# Patient Record
Sex: MALE | Race: WHITE | NOT HISPANIC OR LATINO | URBAN - METROPOLITAN AREA
[De-identification: names, ages, dates, MRNs, and addresses within clinical notes are randomized per-mention and may not be internally consistent; named-entity substitution may affect disease eponyms.]

---
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
## 2024-03-26 NOTE — TELEPHONE ENCOUNTER
DIAGNOSIS: Knee pain / no images / self / [employer]     APPOINTMENT DATE: 3.27.24   NOTES STATUS DETAILS   MEDICATION LIST Internal

## 2024-03-27 ENCOUNTER — OFFICE VISIT (OUTPATIENT)
Dept: ORTHOPEDICS | Facility: CLINIC | Age: 19
End: 2024-03-27
Payer: COMMERCIAL

## 2024-03-27 ENCOUNTER — PRE VISIT (OUTPATIENT)
Dept: ORTHOPEDICS | Facility: CLINIC | Age: 19
End: 2024-03-27

## 2024-03-27 ENCOUNTER — ANCILLARY PROCEDURE (OUTPATIENT)
Dept: GENERAL RADIOLOGY | Facility: CLINIC | Age: 19
End: 2024-03-27
Attending: FAMILY MEDICINE
Payer: COMMERCIAL

## 2024-03-27 DIAGNOSIS — S83.005S PATELLAR DISLOCATION, LEFT, SEQUELA: Primary | ICD-10-CM

## 2024-03-27 DIAGNOSIS — M25.569 KNEE PAIN: ICD-10-CM

## 2024-03-27 DIAGNOSIS — M89.8X9 HETEROTOPIC OSSIFICATION: ICD-10-CM

## 2024-03-27 PROCEDURE — 99203 OFFICE O/P NEW LOW 30 MIN: CPT | Performed by: FAMILY MEDICINE

## 2024-03-27 PROCEDURE — 73562 X-RAY EXAM OF KNEE 3: CPT | Mod: LT | Performed by: RADIOLOGY

## 2024-03-27 NOTE — LETTER
"  3/27/2024      RE: Suresh Will  [address] [place], [place] [address]  [facility] [address]     Dear Colleague,    Thank you for referring your patient, Suresh Will, to the [facility] [place]. Please see a copy of my visit note below.    Left knee pain, patella instability    Today, the patient reports that he has had intermittent left knee pain since 2021 since a patella dislocation playing football.  During that injury the kneecap was dislocated laterally on the football field but was replaced by athletic trainers on the field.  Patient did not need additional evaluation at that time.  Since 3.25.24, he was just in his kitchen walking and he felt some instability and inability to extend the knee. He had some persistent popping and then yesterday he had another pop with pain and has been able to extend the knee. He did have some increased swelling since this instance. He is not taking any medication for the pain regularly. He did do some physical therapy with his high school [employer]  after his initial football injury. 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  His current sports include weight lifting.  He is not involved in planting and pivoting sports at this time.    He has noted a \"lump\" in the soft tissues at the distal lateral left thigh that \"pops out\" from time to time.  He separates this from the symptoms he notes with his anterior knee.              PMH:  Left patella dislocation    Active problem list:  There is no problem list on file for this patient.      FH:  No family history on file.    SH:  Social History     Socioeconomic History     Marital status: Single     Spouse name: Not on file     Number of children: Not on file     Years of education: Not on file     Highest education level: Not on file   Occupational History     Not on file   Tobacco Use     Smoking status: Not on file     Smokeless tobacco: Not on " file   Substance and Sexual Activity     Alcohol use: Not on file     Drug use: Not on file     Sexual activity: Not on file   Other Topics Concern     Not on file   Social History Narrative     Not on file     Social Determinants of Health     Financial Resource Strain: Not on file   Food Insecurity: Not on file   Transportation Needs: Not on file   Physical Activity: Not on file   Stress: Not on file   Social Connections: Not on file   Interpersonal Safety: Not on file   Housing Stability: Not on file       MEDS:  See EMR, reviewed  ALL:  See EMR, reviewed    REVIEW OF SYSTEMS:  CONSTITUTIONAL:NEGATIVE for fever, chills, change in weight  INTEGUMENTARY/SKIN: NEGATIVE for worrisome rashes, moles or lesions  EYES: NEGATIVE for vision changes or irritation  ENT/MOUTH: NEGATIVE for ear, mouth and throat problems  RESP:NEGATIVE for significant cough or SOB  BREAST: NEGATIVE for masses, tenderness or discharge  CV: NEGATIVE for chest pain, palpitations or peripheral edema  GI: NEGATIVE for nausea, abdominal pain, heartburn, or change in bowel habits  :NEGATIVE for frequency, dysuria, or hematuria  :NEGATIVE for frequency, dysuria, or hematuria  NEURO: NEGATIVE for weakness, dizziness or paresthesias  ENDOCRINE: NEGATIVE for temperature intolerance, skin/hair changes  HEME/ALLERGY/IMMUNE: NEGATIVE for bleeding problems  PSYCHIATRIC: NEGATIVE for changes in mood or affect        Objective: He has a thumbprint sized, smooth, uniform, subcutaneous mass present in the soft tissues lateral to the superior aspect of the left knee, along the distal femur.  Nontender over the medial lateral patellar facet.  Nontender over the area of the MPFL.  Symmetrical amount of medial lateral patellar excursion.  Nontender over the medial or lateral joint line of the knee.  No effusion.  I can flex and extend the knee fully.  Anterior and posterior drawer are negative.  Lachman's appears negative.  MCL and LCL stresses are negative.   Overlying skin is normal.  Appropriate conversation and affect.    Personally viewed the patient standing x-rays of the knee that suggests no degenerative change.  There is a smooth, ovoid, thumbprint sized calcification in the soft tissue that is lateral to the left superior knee, that could be consistent with a calcified lipoma or an area of heterotopic ossification.    Assessment: Recurrent left-sided knee patellar subluxation status post history of leonardo dislocation, suspect MPFL disruption.  #2 x-ray finding of soft tissue calcification, possibly consistent with calcified lipoma or heterotopic ossification.    Plan: MRI of the knee is pending to evaluate the MPFL.  We discussed surgical options.  Will discuss it further in phone call follow-up after the MRI.                Again, thank you for allowing me to participate in the care of your patient.      Sincerely,    Connor Momin MD

## 2024-03-27 NOTE — PROGRESS NOTES
"Left knee pain, patella instability    Today, the patient reports that he has had intermittent left knee pain since 2021 since a patella dislocation playing football.  During that injury the kneecap was dislocated laterally on the football field but was replaced by athletic trainers on the field.  Patient did not need additional evaluation at that time.  Since 3.25.24, he was just in his kitchen walking and he felt some instability and inability to extend the knee. He had some persistent popping and then yesterday he had another pop with pain and has been able to extend the knee. He did have some increased swelling since this instance. He is not taking any medication for the pain regularly. He did do some physical therapy with his high school [employer]  after his initial football injury. 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  His current sports include weight lifting.  He is not involved in planting and pivoting sports at this time.    He has noted a \"lump\" in the soft tissues at the distal lateral left thigh that \"pops out\" from time to time.  He separates this from the symptoms he notes with his anterior knee.              PMH:  Left patella dislocation    Active problem list:  There is no problem list on file for this patient.      FH:  No family history on file.    SH:  Social History     Socioeconomic History    Marital status: Single     Spouse name: Not on file    Number of children: Not on file    Years of education: Not on file    Highest education level: Not on file   Occupational History    Not on file   Tobacco Use    Smoking status: Not on file    Smokeless tobacco: Not on file   Substance and Sexual Activity    Alcohol use: Not on file    Drug use: Not on file    Sexual activity: Not on file   Other Topics Concern    Not on file   Social History Narrative    Not on file     Social Determinants of Health     Financial Resource Strain: Not on file   Food " Insecurity: Not on file   Transportation Needs: Not on file   Physical Activity: Not on file   Stress: Not on file   Social Connections: Not on file   Interpersonal Safety: Not on file   Housing Stability: Not on file       MEDS:  See EMR, reviewed  ALL:  See EMR, reviewed    REVIEW OF SYSTEMS:  CONSTITUTIONAL:NEGATIVE for fever, chills, change in weight  INTEGUMENTARY/SKIN: NEGATIVE for worrisome rashes, moles or lesions  EYES: NEGATIVE for vision changes or irritation  ENT/MOUTH: NEGATIVE for ear, mouth and throat problems  RESP:NEGATIVE for significant cough or SOB  BREAST: NEGATIVE for masses, tenderness or discharge  CV: NEGATIVE for chest pain, palpitations or peripheral edema  GI: NEGATIVE for nausea, abdominal pain, heartburn, or change in bowel habits  :NEGATIVE for frequency, dysuria, or hematuria  :NEGATIVE for frequency, dysuria, or hematuria  NEURO: NEGATIVE for weakness, dizziness or paresthesias  ENDOCRINE: NEGATIVE for temperature intolerance, skin/hair changes  HEME/ALLERGY/IMMUNE: NEGATIVE for bleeding problems  PSYCHIATRIC: NEGATIVE for changes in mood or affect        Objective: He has a thumbprint sized, smooth, uniform, subcutaneous mass present in the soft tissues lateral to the superior aspect of the left knee, along the distal femur.  Nontender over the medial lateral patellar facet.  Nontender over the area of the MPFL.  Symmetrical amount of medial lateral patellar excursion.  Nontender over the medial or lateral joint line of the knee.  No effusion.  I can flex and extend the knee fully.  Anterior and posterior drawer are negative.  Lachman's appears negative.  MCL and LCL stresses are negative.  Overlying skin is normal.  Appropriate conversation and affect.    Personally viewed the patient standing x-rays of the knee that suggests no degenerative change.  There is a smooth, ovoid, thumbprint sized calcification in the soft tissue that is lateral to the left superior knee, that could  be consistent with a calcified lipoma or an area of heterotopic ossification.    Assessment: Recurrent left-sided knee patellar subluxation status post history of leonardo dislocation, suspect MPFL disruption.  #2 x-ray finding of soft tissue calcification, possibly consistent with calcified lipoma or heterotopic ossification.    Plan: MRI of the knee is pending to evaluate the MPFL.  We discussed surgical options.  Will discuss it further in phone call follow-up after the MRI.

## 2024-04-21 ENCOUNTER — ANCILLARY PROCEDURE (OUTPATIENT)
Dept: MRI IMAGING | Facility: CLINIC | Age: 19
End: 2024-04-21
Attending: FAMILY MEDICINE
Payer: COMMERCIAL

## 2024-04-21 DIAGNOSIS — S83.005S PATELLAR DISLOCATION, LEFT, SEQUELA: ICD-10-CM

## 2024-04-21 DIAGNOSIS — M89.8X9 HETEROTOPIC OSSIFICATION: ICD-10-CM

## 2024-04-21 PROCEDURE — 73721 MRI JNT OF LWR EXTRE W/O DYE: CPT | Mod: LT | Performed by: RADIOLOGY

## 2024-04-25 ENCOUNTER — VIRTUAL VISIT (OUTPATIENT)
Dept: ORTHOPEDICS | Facility: CLINIC | Age: 19
End: 2024-04-25
Payer: COMMERCIAL

## 2024-04-25 ENCOUNTER — TELEPHONE (OUTPATIENT)
Dept: ORTHOPEDICS | Facility: CLINIC | Age: 19
End: 2024-04-25

## 2024-04-25 DIAGNOSIS — M23.42 LOOSE BODY OF LEFT KNEE: ICD-10-CM

## 2024-04-25 DIAGNOSIS — S83.005S PATELLAR DISLOCATION, LEFT, SEQUELA: Primary | ICD-10-CM

## 2024-04-25 PROCEDURE — 99213 OFFICE O/P EST LOW 20 MIN: CPT | Mod: 93 | Performed by: FAMILY MEDICINE

## 2024-04-25 NOTE — LETTER
4/25/2024       RE: Suresh Will  [address]  [facility] [address]     Dear Colleague,    Thank you for referring your patient, Suresh Will, to the [facility]TS MEDICINE [facility]IN[facility] [place] at [facility]. Please see a copy of my visit note below.    Phone fup MRI left knee, h/o Recurrent left-sided knee patellar subluxation status post history of leonardo dislocation, suspect MPFL disruption. #2 x-ray finding of soft tissue calcification, possibly consistent with calcified lipoma or heterotopic ossification.     MRI left knee 4/21/2024 consistent with an intact MPFL, with some full-thickness cartilage loss at the median ridge and along the far lateral trochlear groove.  Large joint body lateral to the suprapatellar recess 2 x 2 cm in size.        Phone start:  11:40  Phone stop:  11:45a      S: I personally reviewed with the patient the MRI results.  The MPFL appears to be intact.  It is possible to the large joint body could be responsible for his mechanical knee symptoms.  Patient was referred to Dr. Toure in orthopedics to discuss the MRI and options for the knee      O:  GENERAL: healthy, alert, without distress  RESP: No audible wheeze, cough.  No increased work of breathing.    NEURO:  Mentation and speech appropriate for age.  PSYCH: mentation appears normal, concentration appears appropriate, judgement and insight intact.  MSK:      He has had intermittent left knee pain since 2021 since a patella dislocation playing football.  During that injury the kneecap was dislocated laterally on the football field but was replaced by athletic trainers on the field.  Patient did not need additional evaluation at that time.  Since 3.25.24, he was just in his kitchen walking and he felt some instability and inability to extend the knee. He had some persistent popping and then yesterday he had another pop with pain and has been able  "to extend the knee. He did have some increased swelling since this instance. He is not taking any medication for the pain regularly. He did do some physical therapy with his high school [employer]  after his initial football injury. 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  His current sports include weight lifting.  He is not involved in planting and pivoting sports at this time.     He has noted a \"lump\" in the soft tissues at the distal lateral left thigh that \"pops out\" from time to time.  He separates this from the symptoms he notes with his anterior knee.  He has a thumbprint sized, smooth, uniform, subcutaneous mass present in the soft tissues lateral to the superior aspect of the left knee, along the distal femur.       Again, thank you for allowing me to participate in the care of your patient.      Sincerely,    Connor Momin MD    "

## 2024-04-25 NOTE — TELEPHONE ENCOUNTER
----- Message from Kamryn Cam ATC sent at 4/25/2024 11:44 AM CDT -----  Hey can you guys help him get set up with a consult with Dr. Toure. Thank you!      Kamryn HERNANDEZ

## 2024-04-25 NOTE — PROGRESS NOTES
Phone fup MRI left knee, h/o Recurrent left-sided knee patellar subluxation status post history of leonardo dislocation, suspect MPFL disruption. #2 x-ray finding of soft tissue calcification, possibly consistent with calcified lipoma or heterotopic ossification.     MRI left knee 4/21/2024 consistent with an intact MPFL, with some full-thickness cartilage loss at the median ridge and along the far lateral trochlear groove.  Large joint body lateral to the suprapatellar recess 2 x 2 cm in size.        Phone start:  11:40  Phone stop:  11:45a      S: I personally reviewed with the patient the MRI results.  The MPFL appears to be intact.  It is possible to the large joint body could be responsible for his mechanical knee symptoms.  Patient was referred to Dr. Toure in orthopedics to discuss the MRI and options for the knee      O:  GENERAL: healthy, alert, without distress  RESP: No audible wheeze, cough.  No increased work of breathing.    NEURO:  Mentation and speech appropriate for age.  PSYCH: mentation appears normal, concentration appears appropriate, judgement and insight intact.  MSK:      He has had intermittent left knee pain since 2021 since a patella dislocation playing football.  During that injury the kneecap was dislocated laterally on the football field but was replaced by athletic trainers on the field.  Patient did not need additional evaluation at that time.  Since 3.25.24, he was just in his kitchen walking and he felt some instability and inability to extend the knee. He had some persistent popping and then yesterday he had another pop with pain and has been able to extend the knee. He did have some increased swelling since this instance. He is not taking any medication for the pain regularly. He did do some physical therapy with his high school [employer]  after his initial football injury. He is a sophomore at the [employer], studying [employer].  His current sports  "include weight lifting.  He is not involved in planting and pivoting sports at this time.     He has noted a \"lump\" in the soft tissues at the distal lateral left thigh that \"pops out\" from time to time.  He separates this from the symptoms he notes with his anterior knee.  He has a thumbprint sized, smooth, uniform, subcutaneous mass present in the soft tissues lateral to the superior aspect of the left knee, along the distal femur.   "

## 2024-04-25 NOTE — TELEPHONE ENCOUNTER
Patient confirmed scheduled appointment:  Date: 4/29  Time: 11:00am  Visit type: New knee  Provider: Dr. Toure

## 2024-04-26 NOTE — TELEPHONE ENCOUNTER
DIAGNOSIS: L knee, per Dr. Momin    APPOINTMENT DATE: 4/29/24   NOTES STATUS DETAILS   OFFICE NOTE from referring provider Internal 3/27/24 - Connor Momin MD - [facility] Sports Med   MEDICATION LIST Internal    MRI Internal 4/21/24 - MR Knee Left   XRAYS (IMAGES & REPORTS) Internal 3/27/24 - XR Knee Left

## 2024-04-29 ENCOUNTER — OFFICE VISIT (OUTPATIENT)
Dept: ORTHOPEDICS | Facility: CLINIC | Age: 19
End: 2024-04-29
Payer: COMMERCIAL

## 2024-04-29 ENCOUNTER — TELEPHONE (OUTPATIENT)
Dept: ORTHOPEDICS | Facility: CLINIC | Age: 19
End: 2024-04-29

## 2024-04-29 ENCOUNTER — PRE VISIT (OUTPATIENT)
Dept: ORTHOPEDICS | Facility: CLINIC | Age: 19
End: 2024-04-29

## 2024-04-29 VITALS — WEIGHT: 205 LBS | HEIGHT: 69 IN | BODY MASS INDEX: 30.36 KG/M2

## 2024-04-29 DIAGNOSIS — G89.29 CHRONIC PAIN OF LEFT KNEE: Primary | ICD-10-CM

## 2024-04-29 DIAGNOSIS — M25.562 CHRONIC PAIN OF LEFT KNEE: Primary | ICD-10-CM

## 2024-04-29 PROCEDURE — 99204 OFFICE O/P NEW MOD 45 MIN: CPT | Mod: GC | Performed by: ORTHOPAEDIC SURGERY

## 2024-04-29 NOTE — PROGRESS NOTES
Patient seen and examined with the resident. I also personally reviewed the images and interpreted the imaging myself.     Assesment: recurrent patellar instability    Large loose body    Patellar and lateral trochlear chondrosis    Plan: I had a long discussion with the patient.  Reviewed the diagnosis potential treatment options.  I offered him the following course of action: Examination under anesthesia, knee arthroscopy, loose body removal, chondroplasty, autologous chondrocyte implantation, medial patellofemoral ligament reconstruction with allograft, possible lateral retinacular lengthening, tibial tubercle osteotomy.    I discussed with him the pros cons risks and benefits of surgery versus nonsurgical.  We discussed the expected course of recovery and alternative treatment options.  Will look for time to schedule this can be completed.    I agree with history, physical and imaging as well as the assessment and plan as detailed by Dr. Devi.

## 2024-04-29 NOTE — PROGRESS NOTES
CHIEF CONCERN: Left recurrent patellar instability    HISTORY:   Suresh Will is a 19-year-old otherwise healthy male who presents for initial evaluation of left recurrent patellar instability.      Patient states that he had his first patellar dislocation in 2021 when playing football.  This required reduction by the .  He was evaluated at that time and started on a formal physical therapy regimen.  His symptoms improved and his knee felt good for him.  Denies any feelings of ongoing subluxations though he did have catching sensations.    1 month prior to arrival, he believes he had a second patellar instability episode.  He was in his kitchen when he pivoted and felt like his knee was locked and had to provide a self reduction.    Since that time, his pain and swelling have improved though he has ongoing patellar apprehension.  No pain at rest.  6/10 pain at its worst.  No aggravating or alleviating factors; states that it seems random.  Denies use of pain medications.  Remote history of physical therapy.  No injections.    Is able to tolerate running but has constant clicking and catching sensations.  No locking episodes.    No history of surgery to the left knee.  No contralateral knee issues.  No known family history of patellar instability or hyperlaxity    PAST MEDICAL HISTORY: (Reviewed with the patient and in the [facility] medical record)  No past medical history on file.    PAST SURGICAL HISTORY: (Reviewed with the patient and in the [facility] medical record)  No past surgical history on file.    MEDICATIONS: (Reviewed with the patient and in the [facility] medical record)    Notable medications include: N/a    ALLERGIES: (Reviewed with the patient and in the [facility] medical record)   No Known Allergies      SOCIAL HISTORY: (Reviewed with the patient and in the medical record)  --Tobacco: Denies  --Occupation: Sophomore at the [employer])  --Avocation/Sport: Lifting,  running    FAMILY HISTORY: (Reviewed with the patient and in the medical record)  -- No family history of bleeding, clotting, or difficulty with anesthesia    REVIEW OF SYSTEMS: (Reviewed with the patient and on the health intake form)  -- A comprehensive 10 point review of systems was conducted and is negative except as noted in the HPI    EXAM:     General: Awake, Alert and Oriented, No acute Distress. Articulate and Interactive    Body mass index is 30.27 kg/m .    Left lower extremity :  Skin is Warm and Well perfused, no suggestion of infection  No effusion  Nontender to palpation about the superior/inferior pole patella, medial/lateral patellar facet, medial/lateral joint line  Knee range of motion 0-140 without pain or crepitus symmetric to the contralateral  2 quadrants of medial patellar translation, 2 quadrants of lateral patellar translation with apprehension  -5 patellar tilt  No J sign  Stable to varus and valgus stress at 0 and 30 degrees of knee flexion  Stable Lachman and posterior drawer  EHL/FHL/TA/GS 5/5  Sensation intact L3-S1  2+ Dorsalis Pedis Pulse    Beighton score: 4/9 (bilateral fifth MCP hyperextension, bilateral elbow hyperextension)    IMAGING:    Radiographs of the left knee from 3/27/2024 were independently reviewed by me and findings were discussed with the patient today. The imaging demonstrates a loose body in the lateral gutter.  No acute fractures or dislocations.  No significant degenerative change.  CDI 1.51 (elevated).  Small anterior distal femoral boss.  Sulcus angle 120 degrees (normal)    MRI of the left knee from 4/21/2024 were independently reviewed by me and findings were discussed with the patient today. The imaging demonstrates lateral patellar tilt 22.6 degrees (elevated), TT-TG 18 mm (normal), patellotrochlear index 0.28 (normal), large loose body, large cartilage loss about the medial trochlea and medial patellar facet, chondral fracture of the medial  eminence.    ASSESSMENT:  Left recurrent patellar instability  Left knee loose body    A long conversation the patient regarding his symptoms and how they align with his imaging and examination.    Counseled the patient that nonoperative management is the mainstay of treatment for first-time patellar dislocations.  This had been previously successful for the past 3 years.  With recurrent instability, the risk of ongoing instability episodes is roughly 10 to 20%.    Discussed nonoperative and operative management strategies.  Nonoperative management would entail activity modification and physical therapy with a recognition of possible ongoing patellar instability events.    Operative management could take 2 different forms.  1 could simply be an arthroscopic loose body removal, recognizing that this will not address his risk of future patellar instability events.  The second option could entail a diagnostic arthroscopy with loose body removal, MPFL allograft reconstruction, tibial tubercle osteotomy, SWAPNIL biopsy, possible lateral retinacular lengthening.    Discussed the risks, benefits, and alternatives of surgical intervention including but not limited to infection, damage nearby structures like blood vessels and nerves, incomplete pain relief, ongoing patellar instability.    Discussed the expected postoperative rehabilitation protocols for both surgical options.    Patient acknowledged understanding and stated he is leaning towards the more comprehensive surgical intervention.    PLAN:  Operative plan: left knee diagnostic arthroscopy with loose body removal, MPFL allograft reconstruction, tibial tubercle osteotomy, SWAPNIL biopsy, possible lateral retinacular lengthening  Okay to continue weightbearing and activities as tolerated in the interim  Follow-up with Dr. Toure date of surgery    Seen and discussed with Dr. Mesfin Devi MD  Orthopaedic Surgery PGY-5

## 2024-04-29 NOTE — LETTER
4/29/2024         RE: Suresh Will  [address] [place] [address] [address]  [facility] [address]        Dear Colleague,    Thank you for referring your patient, Suresh Will, to the [facility] [place]. Please see a copy of my visit note below.    CHIEF CONCERN: Left recurrent patellar instability    HISTORY:   Suresh Will is a 19-year-old otherwise healthy male who presents for initial evaluation of left recurrent patellar instability.      Patient states that he had his first patellar dislocation in 2021 when playing football.  This required reduction by the .  He was evaluated at that time and started on a formal physical therapy regimen.  His symptoms improved and his knee felt good for him.  Denies any feelings of ongoing subluxations though he did have catching sensations.    1 month prior to arrival, he believes he had a second patellar instability episode.  He was in his kitchen when he pivoted and felt like his knee was locked and had to provide a self reduction.    Since that time, his pain and swelling have improved though he has ongoing patellar apprehension.  No pain at rest.  6/10 pain at its worst.  No aggravating or alleviating factors; states that it seems random.  Denies use of pain medications.  Remote history of physical therapy.  No injections.    Is able to tolerate running but has constant clicking and catching sensations.  No locking episodes.    No history of surgery to the left knee.  No contralateral knee issues.  No known family history of patellar instability or hyperlaxity    PAST MEDICAL HISTORY: (Reviewed with the patient and in the [facility] medical record)  No past medical history on file.    PAST SURGICAL HISTORY: (Reviewed with the patient and in the [facility] medical record)  No past surgical history on file.    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 Articulate and Interactive    Body mass index is 30.27 kg/m .    Left lower extremity :  Skin is Warm and Well perfused, no suggestion of infection  No effusion  Nontender to palpation about the superior/inferior pole patella, medial/lateral patellar facet, medial/lateral joint line  Knee range of motion 0-140 without pain or crepitus symmetric to the contralateral  2 quadrants of medial patellar translation, 2 quadrants of lateral patellar translation with apprehension  -5 patellar tilt  No J sign  Stable to varus and valgus stress at 0 and 30 degrees of knee flexion  Stable Lachman and posterior drawer  EHL/FHL/TA/GS 5/5  Sensation intact L3-S1  2+ Dorsalis Pedis Pulse    Beighton score: 4/9 (bilateral fifth MCP hyperextension, bilateral elbow hyperextension)    IMAGING:    Radiographs of the left knee from 3/27/2024 were independently reviewed by me and findings were discussed with the patient today. The imaging demonstrates a loose body in the lateral gutter.  No acute fractures or dislocations.  No significant degenerative change.  CDI 1.51 (elevated).  Small anterior distal femoral boss.  Sulcus angle 120 degrees (normal)    MRI of the left knee from 4/21/2024 were independently reviewed by me and findings were discussed with the  patient today. The imaging demonstrates lateral patellar tilt 22.6 degrees (elevated), TT-TG 18 mm (normal), patellotrochlear index 0.28 (normal), large loose body, large cartilage loss about the medial trochlea and medial patellar facet, chondral fracture of the medial eminence.    ASSESSMENT:  Left recurrent patellar instability  Left knee loose body    A long conversation the patient regarding his symptoms and how they align with his imaging and examination.    Counseled the patient that nonoperative management is the mainstay of treatment for first-time patellar dislocations.  This had been previously successful for the past 3 years.  With recurrent instability, the risk of ongoing instability episodes is roughly 10 to 20%.    Discussed nonoperative and operative management strategies.  Nonoperative management would entail activity modification and physical therapy with a recognition of possible ongoing patellar instability events.    Operative management could take 2 different forms.  1 could simply be an arthroscopic loose body removal, recognizing that this will not address his risk of future patellar instability events.  The second option could entail a diagnostic arthroscopy with loose body removal, MPFL allograft reconstruction, tibial tubercle osteotomy, SWAPNIL biopsy, possible lateral retinacular lengthening.    Discussed the risks, benefits, and alternatives of surgical intervention including but not limited to infection, damage nearby structures like blood vessels and nerves, incomplete pain relief, ongoing patellar instability.    Discussed the expected postoperative rehabilitation protocols for both surgical options.    Patient acknowledged understanding and stated he is leaning towards the more comprehensive surgical intervention.    PLAN:  Operative plan: left knee diagnostic arthroscopy with loose body removal, MPFL allograft reconstruction, tibial tubercle osteotomy, SWAPNIL biopsy, possible lateral  retinacular lengthening  Okay to continue weightbearing and activities as tolerated in the interim  Follow-up with Dr. Toure date of surgery    Seen and discussed with Dr. Mesfin Devi MD  Orthopaedic Surgery PGY-5    Patient seen and examined with the resident. I also personally reviewed the images and interpreted the imaging myself.     Assesment: recurrent patellar instability    Large loose body    Patellar and lateral trochlear chondrosis    Plan: I had a long discussion with the patient.  Reviewed the diagnosis potential treatment options.  I offered him the following course of action: Examination under anesthesia, knee arthroscopy, loose body removal, chondroplasty, autologous chondrocyte implantation, medial patellofemoral ligament reconstruction with allograft, possible lateral retinacular lengthening, tibial tubercle osteotomy.    I discussed with him the pros cons risks and benefits of surgery versus nonsurgical.  We discussed the expected course of recovery and alternative treatment options.  Will look for time to schedule this can be completed.    I agree with history, physical and imaging as well as the assessment and plan as detailed by Dr. Devi.       Again, thank you for allowing me to participate in the care of your patient.        Sincerely,        Jorge Toure MD

## 2024-04-29 NOTE — TELEPHONE ENCOUNTER
Procedure: scope, SWAPNIL biopsy, tto and mpfl  Facility: [facility]  Length: 120 minutes  Anesthesia: Choice  Post-op appointments needed: 2 weeks provider only, 6 weeks with provider only.  Surgery packet/instructions given to patient?  Yes     Pre-Operative Teaching Flowsheet     Person(s) involved in teaching: Patient     Motivation Level:  Receptive (willing/able to accept information) and asks appropriate questions where applicable: Yes  Any cultural factors/Cheondoism beliefs that may influence understanding or compliance? No     Patient demonstrates understanding of the following:  Pre-operative planning, including the necessary appointments and preparation needed prior to surgery: Yes  Which situations necessitate calling provider and whom to contact: Yes  Pain management techniques pre and post op: Yes  How, and when, to access community resources: Yes    Who will drive and stay/ with patient after surgery: Family  Pre-op exam unsure  PT to be completed at .         Additional Teaching Concerns Addressed:   Post-operative living arrangements and necessary adaptations to living environment.     Instructional Materials Used/Given: Yes, pre-op packet given including forms for Your surgery day, preparing for surgery, showering before surgery, Stop light tool introduced, Opioid pain medication guideline, pre-op physical form, and map  Patient expressed understanding of all forms given, questions were answered and will review in more detail at home.     Time spent with patient: 20 minutes.

## 2024-05-14 NOTE — TELEPHONE ENCOUNTER
Patient is scheduled for surgery with Dr. Toure    Spoke with: Patient    Date of Surgery: 10/8/24    Location: [place]    Post ops: 1 & 6 weeks    Pre op with Provider: Complete    H&P: Scheduled with PAC 9/24/24    Additional imaging/appointments: N/A    Surgery packet: Received in clinic     Additional comments: N/A        Natalie Verde MA on 5/14/2024 at 11:35 AM

## 2024-05-19 ENCOUNTER — HEALTH MAINTENANCE LETTER (OUTPATIENT)
Age: 19
End: 2024-05-19

## 2024-06-27 NOTE — TELEPHONE ENCOUNTER
FUTURE VISIT INFORMATION      SURGERY INFORMATION:  Date: 10/8/24  [facility]  Surgeon:  Jorge Toure MD   Anesthesia Type:  choice  Procedure: Examination under anesthesia, knee arthroscopy, chondroplasty, loose body removal, autologous chondrocyte implantation biopsy Tibial tubercle osteotomy and lateral lengthening Medial patellofemoral ligament reconstruction with allograft   Consult: ov 4/29/24    RECORDS REQUESTED FROM:       Pertinent Medical History: None

## 2024-09-09 NOTE — TELEPHONE ENCOUNTER
Received message that patient would like to potentially reschedule their surgery with Dr. Toure scheduled on 10/08/24. Patient asked to be rescheduled to week of 11/25 specifically. Let patient know at this time Dr. Toure is fully booked that week and following that week, next available date would be 12/10. Patient said he would like to confirm a few things work work/family and will give our office a call back. Patient provided with callback number [phone].     Ashley Kelly on 9/9/2024 at 11:04 AM

## 2024-09-09 NOTE — TELEPHONE ENCOUNTER
Rescheduled surgery with Dr. Toure from 10/08/2024 to 12/10/2024.     Spoke with: Patient     Reason for reschedule: Patient preferred to be scheduled late November or later.     [place] [address]    Patient will be seen for their new H&P by PAC on 11/21/24 at 3:00. - Provided address & floor number. Patient is aware that they will receive their start time for surgery at this appointment    Patients 1 week post op has been rescheduled to 12/19/24 at 9:40 am.   Patients 6 week post op has been rescheduled to 1/22/24 at 8:30 am.     Additional comments: NA      Patient will call back if they have any questions or concerns.    Ashley Kelly on 9/9/2024 at 12:00 PM

## 2024-09-10 NOTE — TELEPHONE ENCOUNTER
FUTURE VISIT INFORMATION        SURGERY INFORMATION:  Date: 12/10/24  [facility]  Surgeon:  Jorge Toure MD   Anesthesia Type:  choice  Procedure: Examination under anesthesia, knee arthroscopy, chondroplasty, loose body removal, autologous chondrocyte implantation biopsy Tibial tubercle osteotomy and lateral lengthening Medial patellofemoral ligament reconstruction with allograft   Consult: beltran 4/29/24     RECORDS REQUESTED FROM:         Pertinent Medical History: None

## 2024-09-24 ENCOUNTER — PRE VISIT (OUTPATIENT)
Dept: SURGERY | Facility: CLINIC | Age: 19
End: 2024-09-24

## 2024-11-04 ENCOUNTER — TELEPHONE (OUTPATIENT)
Dept: ORTHOPEDICS | Facility: CLINIC | Age: 19
End: 2024-11-04
Payer: COMMERCIAL

## 2024-11-04 NOTE — TELEPHONE ENCOUNTER
Left Voicemail (1st Attempt) and Sent Mychart (1st Attempt) for the patient to call back and reschedule the following:    Appointment type: Post Op MSK  Provider: Domi Mahoney  Return date: 12/19 r/s to 12/20 with Lyly Melgoza or 12/24 with Domi    
2

## 2024-11-08 ENCOUNTER — MYC MEDICAL ADVICE (OUTPATIENT)
Dept: ORTHOPEDICS | Facility: CLINIC | Age: 19
End: 2024-11-08
Payer: COMMERCIAL

## 2024-11-08 DIAGNOSIS — G89.29 CHRONIC PAIN OF LEFT KNEE: Primary | ICD-10-CM

## 2024-11-08 DIAGNOSIS — M25.562 CHRONIC PAIN OF LEFT KNEE: Primary | ICD-10-CM

## 2024-11-21 ENCOUNTER — PRE VISIT (OUTPATIENT)
Dept: SURGERY | Facility: CLINIC | Age: 19
End: 2024-11-21

## 2024-11-21 ENCOUNTER — OFFICE VISIT (OUTPATIENT)
Dept: SURGERY | Facility: CLINIC | Age: 19
End: 2024-11-21
Payer: COMMERCIAL

## 2024-11-21 ENCOUNTER — ANESTHESIA EVENT (OUTPATIENT)
Dept: SURGERY | Facility: CLINIC | Age: 19
End: 2024-11-21
Payer: COMMERCIAL

## 2024-11-21 ENCOUNTER — LAB (OUTPATIENT)
Dept: LAB | Facility: CLINIC | Age: 19
End: 2024-11-21
Payer: COMMERCIAL

## 2024-11-21 VITALS
TEMPERATURE: 98.8 F | HEIGHT: 69 IN | RESPIRATION RATE: 16 BRPM | SYSTOLIC BLOOD PRESSURE: 150 MMHG | HEART RATE: 93 BPM | OXYGEN SATURATION: 96 % | DIASTOLIC BLOOD PRESSURE: 90 MMHG | WEIGHT: 205.8 LBS | BODY MASS INDEX: 30.48 KG/M2

## 2024-11-21 DIAGNOSIS — G89.29 CHRONIC PAIN OF LEFT KNEE: ICD-10-CM

## 2024-11-21 DIAGNOSIS — Z01.818 PREOP EXAMINATION: Primary | ICD-10-CM

## 2024-11-21 DIAGNOSIS — M25.562 CHRONIC PAIN OF LEFT KNEE: ICD-10-CM

## 2024-11-21 DIAGNOSIS — Z01.818 PREOP EXAMINATION: ICD-10-CM

## 2024-11-21 LAB
ANION GAP SERPL CALCULATED.3IONS-SCNC: 11 MMOL/L (ref 7–15)
BUN SERPL-MCNC: 18.9 MG/DL (ref 6–20)
CALCIUM SERPL-MCNC: 9.8 MG/DL (ref 8.8–10.4)
CHLORIDE SERPL-SCNC: 103 MMOL/L (ref 98–107)
CREAT SERPL-MCNC: 1.17 MG/DL (ref 0.67–1.17)
EGFRCR SERPLBLD CKD-EPI 2021: >90 ML/MIN/1.73M2
ERYTHROCYTE [DISTWIDTH] IN BLOOD BY AUTOMATED COUNT: 12.1 % (ref 10–15)
GLUCOSE SERPL-MCNC: 91 MG/DL (ref 70–99)
HCO3 SERPL-SCNC: 26 MMOL/L (ref 22–29)
HCT VFR BLD AUTO: 47 % (ref 40–53)
HGB BLD-MCNC: 16.3 G/DL (ref 13.3–17.7)
MCH RBC QN AUTO: 28.7 PG (ref 26.5–33)
MCHC RBC AUTO-ENTMCNC: 34.7 G/DL (ref 31.5–36.5)
MCV RBC AUTO: 83 FL (ref 78–100)
PLATELET # BLD AUTO: 231 10E3/UL (ref 150–450)
POTASSIUM SERPL-SCNC: 3.9 MMOL/L (ref 3.4–5.3)
RBC # BLD AUTO: 5.67 10E6/UL (ref 4.4–5.9)
SODIUM SERPL-SCNC: 140 MMOL/L (ref 135–145)
WBC # BLD AUTO: 9 10E3/UL (ref 4–11)

## 2024-11-21 PROCEDURE — 80048 BASIC METABOLIC PNL TOTAL CA: CPT | Performed by: PATHOLOGY

## 2024-11-21 PROCEDURE — 99203 OFFICE O/P NEW LOW 30 MIN: CPT | Performed by: PHYSICIAN ASSISTANT

## 2024-11-21 PROCEDURE — 36415 COLL VENOUS BLD VENIPUNCTURE: CPT | Performed by: PATHOLOGY

## 2024-11-21 PROCEDURE — 85027 COMPLETE CBC AUTOMATED: CPT | Performed by: PATHOLOGY

## 2024-11-21 ASSESSMENT — LIFESTYLE VARIABLES: TOBACCO_USE: 0

## 2024-11-21 ASSESSMENT — PAIN SCALES - GENERAL: PAINLEVEL_OUTOF10: NO PAIN (0)

## 2024-11-21 ASSESSMENT — ENCOUNTER SYMPTOMS: SEIZURES: 0

## 2024-11-21 NOTE — H&P
Pre-Operative H & P     CC:  Preoperative exam to assess for increased cardiopulmonary risk while undergoing surgery and anesthesia.    Date of Encounter: 11/21/2024  Primary Care Physician:  No primary care provider on file.     Reason for visit:   Encounter Diagnoses   Name Primary?    Chronic pain of left knee Yes    Preop examination        HPI  Suresh Will is a 19 year old male who presents for pre-operative H & P in preparation for  Procedure Information       Case: [id number] Date/Time: 12/10/24 0800    Procedures:       Examination under anesthesia, knee arthroscopy, chondroplasty, loose body removal, autologous chondrocyte implantation biopsy (Left: Knee)      Tibial tubercle osteotomy and lateral lengthening (Left: Leg)      Medial patellofemoral ligament reconstruction with allograft (Left: Knee)    Anesthesia type: Choice    Diagnosis: Chronic pain of left knee [M25.562, G89.29]    Pre-op diagnosis: Chronic pain of left knee [M25.562, G89.29]    Location:  OR 11 / [facility]    Providers: Jorge Toure MD            Mr. Will has a history of left knee pain and recurrent patellar instability. He had sustained a patellar dislocation in 2021 when playing football. Then earlier this year, he was in his kitchen when he pivoted and felt like his knee was locked and had to provide a self reduction.  Since that time, his pain and swelling have improved though he has ongoing patellar apprehension. He is able to tolerate running but has constant clicking and catching sensations. He has been counseled by Dr. Toure and now presents for the above procedure.      PMH is otherwise unremarkable.     History is obtained from the patient and chart review    Hx of abnormal bleeding or anti-platelet use: denies      Past Medical History  Past Medical History:   Diagnosis Date    Chronic pain of left knee 2024       Past Surgical History  Past Surgical History:   Procedure Laterality Date     ABDOMINAL HERNIA REPAIR      in infancy    ABDOMINAL HERNIA REPAIR  11/2021       Prior to Admission Medications  No current outpatient medications on file.       Allergies  No Known Allergies    Social History  Social History     Socioeconomic History    Marital status: Single     Spouse name: Not on file    Number of children: Not on file    Years of education: Not on file    Highest education level: Not on file   Occupational History    Not on file   Tobacco Use    Smoking status: Never    Smokeless tobacco: Never   Substance and Sexual Activity    Alcohol use: Yes     Comment: Sometimes    Drug use: Not Currently    Sexual activity: Not on file   Other Topics Concern    Not on file   Social History Narrative    Not on file     Social Drivers of Health     Financial Resource Strain: Not on file   Food Insecurity: Not on file   Transportation Needs: Not on file   Physical Activity: Not on file   Stress: Not on file   Social Connections: Not on file   Interpersonal Safety: Not on file   Housing Stability: Not on file       Family History  Family History   Problem Relation Age of Onset    Anesthesia Reaction No family hx of     Deep Vein Thrombosis (DVT) No family hx of        Review of Systems  The complete review of systems is negative other than noted in the HPI or here.   Anesthesia Evaluation   Pt has had prior anesthetic.     No history of anesthetic complications       ROS/MED HX  ENT/Pulmonary:  - neg pulmonary ROS  (-) tobacco use, asthma, sleep apnea and recent URI   Neurologic:  - neg neurologic ROS  (-) no seizures and no CVA   Cardiovascular:  - neg cardiovascular ROS     METS/Exercise Tolerance: >4 METS    Hematologic:  - neg hematologic  ROS  (-) history of blood clots and history of blood transfusion   Musculoskeletal: Comment: Left knee pain, recurrent patellar instability.      GI/Hepatic:  - neg GI/hepatic ROS  (-) GERD and liver disease   Renal/Genitourinary:  - neg Renal ROS  (-) renal disease  "  Endo:  - neg endo ROS  (-) Type II DM   Psychiatric/Substance Use:  - neg psychiatric ROS     Infectious Disease:  - neg infectious disease ROS     Malignancy:  - neg malignancy ROS     Other:  - neg other ROS          BP (!) 150/90 (BP Location: Right arm, Patient Position: Sitting, Cuff Size: Adult Regular)   Pulse 93   Temp 98.8  F (37.1  C) (Oral)   Resp 16   Ht 1.753 m (5' 9\")   Wt 93.4 kg (205 lb 12.8 oz)   SpO2 96%   BMI 30.39 kg/m      Physical Exam  Constitutional: Awake, alert, cooperative, no apparent distress, and appears stated age.  Eyes: Pupils equal, round and reactive to light, extra ocular muscles intact, sclera clear, conjunctiva normal.  HENT: Normocephalic, oral pharynx with moist mucus membranes, good dentition. No goiter appreciated. No removable dental hardware.  Respiratory: Clear to auscultation bilaterally, no crackles or wheezing. No SOB when supine.  Cardiovascular: Regular rate and rhythm, normal S1 and S2, and no murmur noted.  Carotids +2, no bruits. No edema. Palpable pulses to radial, DP and PT arteries.   GI: Normal bowel sounds, soft, non-distended, non-tender, no masses palpated.    Lymph/Hematologic: No cervical lymphadenopathy and no supraclavicular lymphadenopathy.  Genitourinary:  deferred  Skin: Warm and dry.  No rashes.   Musculoskeletal: full ROM of neck. There is no redness, warmth, or swelling of the joints. Gross motor strength is normal.    Neurologic: Awake, alert, oriented to name, place and time. Cranial nerves II-XII are grossly intact. Gait is normal. Ambulates from chair to exam table, seats self, lies supine and sits back up w/o assistance.  Neuropsychiatric: Calm, cooperative. Normal affect. Pleasant. Answers questions appropriately, follows commands w/o difficulty.        PRIOR LABS/DIAGNOSTIC STUDIES:    All pertinent labs and imaging personally reviewed      MR left knee without contrast 4/21/24  Impression:  1. MRI findings most consistent with " patellofemoral maltracking including:       a. Lateral patellar tilt and narrowing along the lateral  patellofemoral joint compartment       b. Slightly shallow trochlear groove superiorly.       c. Borderline TT-TG distance of 17 mm.       d. Large joint body laterally in the suprapatellar recess,  measuring at least 2.1 x 1.5 x 0.7 cm.       e. Areas of full-thickness cartilage loss centered at the median ridge extending both medially and laterally, and along the far lateral trochlear groove with subchondral bone marrow edema and cystic changes.     2. Intact ACL, PCL, medial supporting structures, lateral supporting structures, and bilateral menisci.      The patient's records and results personally reviewed by this provider.       LAB/DIAGNOSTIC STUDIES TODAY:  BMP, CBC    Assessment    Suresh Will is a 19 year old male seen as a PAC referral for risk assessment and optimization for anesthesia.    Plan/Recommendations  Pt will be optimized for the proposed procedure.  See below for details on the assessment, risk, and preoperative recommendations    NEUROLOGY  - No history of TIA, CVA or seizure    -Post Op delirium risk factors:  No risk identified    ENT  - No current airway concerns.  Will need to be reassessed day of surgery.  Mallampati: III  TM: > 3    CARDIAC  - No history of CAD, Hypertension, and Afib  - METS (Metabolic Equivalents)  Patient performs 4 or more METS exercise without symptoms             Total Score: 0      RCRI-Very low risk: Class 1 0.4% complication rate             Total Score: 0        PULMONARY  ELDA Low Risk             Total Score: 1    ELDA: Male      - Denies asthma or inhaler use  - Tobacco History    History   Smoking Status    Never   Smokeless Tobacco    Never       GI  - denies GERD  PONV Medium Risk  Total Score: 2           1 AN PONV: Patient is not a current smoker    1 AN PONV: Intended Post Op Opioids          ENDOCRINE    - BMI: Estimated body mass index is 30.39  "kg/m  as calculated from the following:    Height as of this encounter: 1.753 m (5' 9\").    Weight as of this encounter: 93.4 kg (205 lb 12.8 oz).  Overweight (BMI 25.0-29.9)  - No history of Diabetes Mellitus    HEME  VTE Low Risk 0.5%             Total Score: 2    VTE: Male      - No history of abnormal bleeding or antiplatelet use.      MSK  Patient is NOT Frail             Total Score: 0      - Left knee pain, recurrent patellar instability.      The patient is aware that the final anesthesia plan will be decided by the assigned anesthesia provider on the date of service.    The patient is optimized for their procedure. AVS with information on surgery time/arrival time, meds and NPO status given by nursing staff. No further diagnostic testing indicated.      On the day of service:     Prep time: 12 minutes  Visit time: 17 minutes  Documentation time: 9 minutes  ------------------------------------------  Total time: 38 minutes      Qi Lebron PA-C  Preoperative Assessment Center  [facility]  Clinic and Surgery Center  Phone: [phone]  Fax: [phone]    "

## 2024-11-21 NOTE — PATIENT INSTRUCTIONS
Preparing for Your Surgery      Name:  Suresh Will   MRN:  [id number]   :  2005   Today's Date:  2024       Arriving for surgery:  Surgery date:  12/10/24  Arrival time:  6:00 am  Surgery time: 8:00 am          Please come to:       [facility] [address]   [address] [place]  [address]     The Green Ramp for patients and visitors is beneath the [facility]. The parking facility entrance is at the intersection of [address] [place] [address] and [address]. Patients and visitors who self-park will receive the reduced hospital parking rate (no ticket validation needed).     [employer] parking, located at the [facility] main entrance on [address] [place] [address], is available Monday - Friday from 7 am to 3:30 pm.     Discounted parking pass options can be purchased from  attendants during business hours.     -Check in at the security desk in the [facility] ([facility] [place])   Lobby. They will direct you to the correct elevators.   -Proceed to the 3rd floor, Adult Surgery Waiting Lounge. [phone]     If you need directions, a wheelchair or escort please stop at the Information Desk in the lobby.  Inform the information person you are here for surgery; a wheelchair and escort to [address] will be provided.   An escort to the Adult Surgery Waiting Lounge will be provided. .    What can I eat or drink?  -  You may eat and drink normally up to 8 hours prior to arrival time. (Until 10:00 pm on 24)  -  You may have clear liquids until 2 hours prior to arrival time. (Until 4:00 am on 12/10/24)    Examples of clear liquids:  Water  Clear broth  Juices (apple, white grape, white cranberry  and cider) without pulp  Noncarbonated, powder based beverages  (lemonade and Ajith-Aid)  Sodas (Sprite, 7-Up, ginger ale and seltzer)  Coffee or tea (without milk or cream)  Gatorade    -  No  Alcohol or cannabis products for at least 24 hours before surgery.     Which medicines can I take?    Hold Ibuprofen (Advil, Motrin) for 3 day(s) before surgery--unless otherwise directed by surgeon.  Hold Naproxen (Aleve) for 4 days before surgery.     Ok to take Acetaminophen (Tylenol) as needed    How do I prepare myself?  - Please take 2 showers (one the night prior to surgery and one the morning of surgery) using Scrubcare or Hibiclens soap.    Use this soap only from the neck to your toes. Avoid genital area      Leave the soap on your skin for one minute--then rinse thoroughly.      You may use your own shampoo and conditioner. No other hair products.   - Please remove all jewelry and body piercings.  - No lotions, deodorants or fragrance.  - Bring your ID and insurance card.    -If you use a CPAP machine, please bring the CPAP machine, tubing, and mask to hospital.    -If you have a Deep Brain Stimulator, Spinal Cord Stimulator, or any Neuro Stimulator device---you must bring the remote control to the hospital.      ALL PATIENTS GOING HOME THE SAME DAY OF SURGERY ARE REQUIRED TO HAVE A RESPONSIBLE ADULT TO DRIVE AND BE IN ATTENDANCE WITH THEM FOR 24 HOURS FOLLOWING SURGERY.    Covid testing policy as of 12/06/2022  Your surgeon will notify and schedule you for a COVID test if one is needed before surgery--please direct any questions or COVID symptoms to your surgeon      Questions or Concerns:    - For any questions regarding the day of surgery or your hospital stay, please contact the Pre Admission Nursing Office at [phone].       - If you have health changes between today and your surgery, please call your surgeon.       - For questions after surgery, please call your surgeons office.           Current Visitor Guidelines    You may have 2 visitors in the pre op area.    Visiting hours: 8 a.m. to 8:30 p.m.    Patients confirmed or suspected to have symptoms of COVID 19 or flu:     No visitors allowed  for adult patients.   Children (under age 18) can have 1 named visitor.     People who are sick or showing symptoms of COVID 19 or flu:    Are not allowed to visit patients--we can only make exceptions in special situations.       Please follow these guidelines for your visit:          Please maintain social distance          Masking is optional--however at times you may be asked to wear a mask for the safety of yourself and others     Clean your hands with alcohol hand . Do this when you arrive at and leave the building and patient room,    And again after you touch your mask or anything in the room.     Go directly to and from the room you are visiting.     Stay in the patient s room during your visit. Limit going to other places in the hospital as much as possible     Leave bags and jackets at home or in the car.     For everyone s health, please don t come and go during your visit. That includes for smoking   during your visit.

## 2024-12-08 ASSESSMENT — LIFESTYLE VARIABLES: TOBACCO_USE: 0

## 2024-12-08 ASSESSMENT — ENCOUNTER SYMPTOMS: SEIZURES: 0

## 2024-12-09 NOTE — ANESTHESIA PREPROCEDURE EVALUATION
Anesthesia Pre-Procedure Evaluation    Patient: Suresh Will   MRN: [id number] : 2005        Procedure : Procedure(s):  Examination under anesthesia, knee arthroscopy, chondroplasty, loose body removal, autologous chondrocyte implantation biopsy, Tibial tubercle osteotomy and lateral lengthening, Medial patellofemoral ligament reconstruction with allograft          Past Medical History:   Diagnosis Date    Chronic pain of left knee       Past Surgical History:   Procedure Laterality Date    ABDOMINAL HERNIA REPAIR      in infancy    ABDOMINAL HERNIA REPAIR  2021      No Known Allergies   Social History     Tobacco Use    Smoking status: Never    Smokeless tobacco: Never   Substance Use Topics    Alcohol use: Yes     Comment: Sometimes      Wt Readings from Last 1 Encounters:   24 93.4 kg (205 lb 12.8 oz)        Anesthesia Evaluation   Pt has had prior anesthetic.     No history of anesthetic complications       ROS/MED HX  ENT/Pulmonary:  - neg pulmonary ROS  (-) tobacco use, asthma, sleep apnea and recent URI   Neurologic:  - neg neurologic ROS  (-) no seizures and no CVA   Cardiovascular:  - neg cardiovascular ROS     METS/Exercise Tolerance: >4 METS    Hematologic:  - neg hematologic  ROS  (-) history of blood clots and history of blood transfusion   Musculoskeletal: Comment: Left knee pain, recurrent patellar instability.      GI/Hepatic:  - neg GI/hepatic ROS  (-) GERD and liver disease   Renal/Genitourinary:  - neg Renal ROS  (-) renal disease   Endo:  - neg endo ROS  (-) Type II DM   Psychiatric/Substance Use:  - neg psychiatric ROS     Infectious Disease:  - neg infectious disease ROS     Malignancy:  - neg malignancy ROS     Other:  - neg other ROS          Physical Exam    Airway        Mallampati: III   TM distance: > 3 FB   Neck ROM: full   Mouth opening: > 3 cm    Respiratory Devices and Support         Dental       (+) Minor Abnormalities - some fillings, tiny  "chips      Cardiovascular   cardiovascular exam normal          Pulmonary   pulmonary exam normal                OUTSIDE LABS:  CBC:   Lab Results   Component Value Date    WBC 9.0 11/21/2024    HGB 16.3 11/21/2024    HCT 47.0 11/21/2024     11/21/2024     BMP:   Lab Results   Component Value Date     11/21/2024    POTASSIUM 3.9 11/21/2024    CHLORIDE 103 11/21/2024    CO2 26 11/21/2024    BUN 18.9 11/21/2024    CR 1.17 11/21/2024    GLC 91 11/21/2024     COAGS: No results found for: \"PTT\", \"INR\", \"FIBR\"  POC: No results found for: \"BGM\", \"HCG\", \"HCGS\"  HEPATIC: No results found for: \"ALBUMIN\", \"PROTTOTAL\", \"ALT\", \"AST\", \"GGT\", \"ALKPHOS\", \"BILITOTAL\", \"BILIDIRECT\", \"AICHA\"  OTHER:   Lab Results   Component Value Date    NITHIN 9.8 11/21/2024       Anesthesia Plan    ASA Status:  1    NPO Status:  NPO Appropriate    Anesthesia Type: General.     - Airway: ETT   Induction: Intravenous, Propofol.   Maintenance: Balanced.   Techniques and Equipment:     - Airway: Video-Laryngoscope     - Lines/Monitors: BIS     Consents    Anesthesia Plan(s) and associated risks, benefits, and realistic alternatives discussed. Questions answered and patient/representative(s) expressed understanding.     - Discussed: Risks, Benefits and Alternatives for the PROCEDURE were discussed     - Discussed with:  Patient      - Specific Concerns: sore throat, injury to teeth, lips, mucosa, nausea & vomiting, disorientation in the immediate post-op period.           Postoperative Care    Pain management: IV analgesics, Oral pain medications, Multi-modal analgesia, Peripheral nerve block (Single Shot).   PONV prophylaxis: Ondansetron (or other 5HT-3), Dexamethasone or Solumedrol     Comments:               Fatou Higgins MD    I have reviewed the pertinent notes and labs in the chart from the past 30 days and (re)examined the patient.  Any updates or changes from those notes are reflected in this note.                         # Obesity: " "Estimated body mass index is 30.39 kg/m  as calculated from the following:    Height as of 11/21/24: 1.753 m (5' 9\").    Weight as of 11/21/24: 93.4 kg (205 lb 12.8 oz).             "

## 2024-12-10 ENCOUNTER — HOSPITAL ENCOUNTER (OUTPATIENT)
Facility: CLINIC | Age: 19
Discharge: HOME | End: 2024-12-10
Attending: ORTHOPAEDIC SURGERY | Admitting: ORTHOPAEDIC SURGERY
Payer: COMMERCIAL

## 2024-12-10 ENCOUNTER — APPOINTMENT (OUTPATIENT)
Dept: GENERAL RADIOLOGY | Facility: CLINIC | Age: 19
End: 2024-12-10
Attending: ORTHOPAEDIC SURGERY
Payer: COMMERCIAL

## 2024-12-10 ENCOUNTER — ANESTHESIA (OUTPATIENT)
Dept: SURGERY | Facility: CLINIC | Age: 19
End: 2024-12-10
Payer: COMMERCIAL

## 2024-12-10 VITALS
HEIGHT: 69 IN | HEART RATE: 85 BPM | OXYGEN SATURATION: 96 % | WEIGHT: 212.08 LBS | TEMPERATURE: 99 F | RESPIRATION RATE: 18 BRPM | BODY MASS INDEX: 31.41 KG/M2 | SYSTOLIC BLOOD PRESSURE: 124 MMHG | DIASTOLIC BLOOD PRESSURE: 67 MMHG

## 2024-12-10 DIAGNOSIS — G89.29 CHRONIC PAIN OF LEFT KNEE: ICD-10-CM

## 2024-12-10 DIAGNOSIS — M25.562 CHRONIC PAIN OF LEFT KNEE: ICD-10-CM

## 2024-12-10 PROCEDURE — C1713 ANCHOR/SCREW BN/BN,TIS/BN: HCPCS | Performed by: ORTHOPAEDIC SURGERY

## 2024-12-10 PROCEDURE — 999N000180 XR SURGERY CARM FLUORO LESS THAN 5 MIN: Mod: TC

## 2024-12-10 PROCEDURE — 29877 ARTHRS KNEE SURG DBRDMT/SHVG: CPT | Mod: LT | Performed by: ORTHOPAEDIC SURGERY

## 2024-12-10 PROCEDURE — 250N000009 HC RX 250: Performed by: ORTHOPAEDIC SURGERY

## 2024-12-10 PROCEDURE — 250N000011 HC RX IP 250 OP 636: Performed by: ORTHOPAEDIC SURGERY

## 2024-12-10 PROCEDURE — 250N000009 HC RX 250: Performed by: ANESTHESIOLOGY

## 2024-12-10 PROCEDURE — 250N000025 HC SEVOFLURANE, PER MIN: Performed by: ORTHOPAEDIC SURGERY

## 2024-12-10 PROCEDURE — 250N000013 HC RX MED GY IP 250 OP 250 PS 637

## 2024-12-10 PROCEDURE — 272N000001 HC OR GENERAL SUPPLY STERILE: Performed by: ORTHOPAEDIC SURGERY

## 2024-12-10 PROCEDURE — 250N000011 HC RX IP 250 OP 636: Performed by: ANESTHESIOLOGY

## 2024-12-10 PROCEDURE — 360N000084 HC SURGERY LEVEL 4 W/ FLUORO, PER MIN: Performed by: ORTHOPAEDIC SURGERY

## 2024-12-10 PROCEDURE — 710N000012 HC RECOVERY PHASE 2, PER MINUTE: Performed by: ORTHOPAEDIC SURGERY

## 2024-12-10 PROCEDURE — 27420 REVISION OF UNSTABLE KNEECAP: CPT | Mod: LT | Performed by: ORTHOPAEDIC SURGERY

## 2024-12-10 PROCEDURE — 272N000002 HC OR SUPPLY OTHER OPNP: Performed by: ORTHOPAEDIC SURGERY

## 2024-12-10 PROCEDURE — 250N000009 HC RX 250

## 2024-12-10 PROCEDURE — 710N000010 HC RECOVERY PHASE 1, LEVEL 2, PER MIN: Performed by: ORTHOPAEDIC SURGERY

## 2024-12-10 PROCEDURE — 27427 RECONSTRUCTION KNEE: CPT | Mod: LT | Performed by: ORTHOPAEDIC SURGERY

## 2024-12-10 PROCEDURE — 258N000001 HC RX 258: Performed by: ORTHOPAEDIC SURGERY

## 2024-12-10 PROCEDURE — 250N000011 HC RX IP 250 OP 636

## 2024-12-10 PROCEDURE — C1762 CONN TISS, HUMAN(INC FASCIA): HCPCS | Performed by: ORTHOPAEDIC SURGERY

## 2024-12-10 PROCEDURE — 370N000017 HC ANESTHESIA TECHNICAL FEE, PER MIN: Performed by: ORTHOPAEDIC SURGERY

## 2024-12-10 PROCEDURE — 258N000003 HC RX IP 258 OP 636

## 2024-12-10 PROCEDURE — 999N000141 HC STATISTIC PRE-PROCEDURE NURSING ASSESSMENT: Performed by: ORTHOPAEDIC SURGERY

## 2024-12-10 PROCEDURE — 64447 NJX AA&/STRD FEMORAL NRV IMG: CPT | Mod: XU,LT

## 2024-12-10 DEVICE — GRAFT BONE FIBERS GRAFTON DBM DBF 6ML T50106: Type: IMPLANTABLE DEVICE | Site: KNEE | Status: FUNCTIONAL

## 2024-12-10 DEVICE — Ø7X 20MM BC IF SCRW, VENTED
Type: IMPLANTABLE DEVICE | Site: KNEE | Status: FUNCTIONAL
Brand: ARTHREX®

## 2024-12-10 DEVICE — IMPLANTABLE DEVICE: Type: IMPLANTABLE DEVICE | Site: KNEE | Status: FUNCTIONAL

## 2024-12-10 DEVICE — GRAFT TENDON SEMITENDINOSUS 26CM 430355: Type: IMPLANTABLE DEVICE | Site: KNEE | Status: FUNCTIONAL

## 2024-12-10 RX ORDER — ASPIRIN 81 MG/1
81 TABLET ORAL 2 TIMES DAILY
Qty: 60 TABLET | Refills: 0 | Status: SHIPPED | OUTPATIENT
Start: 2024-12-10

## 2024-12-10 RX ORDER — ACETAMINOPHEN 325 MG/1
650 TABLET ORAL EVERY 4 HOURS PRN
Qty: 50 TABLET | Refills: 0 | Status: SHIPPED | OUTPATIENT
Start: 2024-12-10

## 2024-12-10 RX ORDER — OXYCODONE HYDROCHLORIDE 10 MG/1
10 TABLET ORAL
Status: DISCONTINUED | OUTPATIENT
Start: 2024-12-10 | End: 2024-12-10 | Stop reason: HOSPADM

## 2024-12-10 RX ORDER — TRANEXAMIC ACID 650 MG/1
1950 TABLET ORAL ONCE
Status: DISCONTINUED | OUTPATIENT
Start: 2024-12-10 | End: 2024-12-10 | Stop reason: ALTCHOICE

## 2024-12-10 RX ORDER — ONDANSETRON 4 MG/1
4 TABLET, ORALLY DISINTEGRATING ORAL EVERY 30 MIN PRN
Status: DISCONTINUED | OUTPATIENT
Start: 2024-12-10 | End: 2024-12-10 | Stop reason: HOSPADM

## 2024-12-10 RX ORDER — DEXAMETHASONE SODIUM PHOSPHATE 4 MG/ML
4 INJECTION, SOLUTION INTRA-ARTICULAR; INTRALESIONAL; INTRAMUSCULAR; INTRAVENOUS; SOFT TISSUE
Status: DISCONTINUED | OUTPATIENT
Start: 2024-12-10 | End: 2024-12-10 | Stop reason: HOSPADM

## 2024-12-10 RX ORDER — PROPOFOL 10 MG/ML
INJECTION, EMULSION INTRAVENOUS CONTINUOUS PRN
Status: DISCONTINUED | OUTPATIENT
Start: 2024-12-10 | End: 2024-12-10

## 2024-12-10 RX ORDER — FLUMAZENIL 0.1 MG/ML
0.2 INJECTION, SOLUTION INTRAVENOUS
Status: DISCONTINUED | OUTPATIENT
Start: 2024-12-10 | End: 2024-12-10 | Stop reason: HOSPADM

## 2024-12-10 RX ORDER — FENTANYL CITRATE 50 UG/ML
25 INJECTION, SOLUTION INTRAMUSCULAR; INTRAVENOUS EVERY 5 MIN PRN
Status: DISCONTINUED | OUTPATIENT
Start: 2024-12-10 | End: 2024-12-10 | Stop reason: HOSPADM

## 2024-12-10 RX ORDER — ACETAMINOPHEN 325 MG/1
975 TABLET ORAL ONCE
Status: DISCONTINUED | OUTPATIENT
Start: 2024-12-10 | End: 2024-12-10 | Stop reason: HOSPADM

## 2024-12-10 RX ORDER — NALOXONE HYDROCHLORIDE 0.4 MG/ML
0.4 INJECTION, SOLUTION INTRAMUSCULAR; INTRAVENOUS; SUBCUTANEOUS
Status: DISCONTINUED | OUTPATIENT
Start: 2024-12-10 | End: 2024-12-10 | Stop reason: HOSPADM

## 2024-12-10 RX ORDER — CEFAZOLIN SODIUM/WATER 2 G/20 ML
2 SYRINGE (ML) INTRAVENOUS SEE ADMIN INSTRUCTIONS
Status: DISCONTINUED | OUTPATIENT
Start: 2024-12-10 | End: 2024-12-10 | Stop reason: HOSPADM

## 2024-12-10 RX ORDER — LIDOCAINE HYDROCHLORIDE 20 MG/ML
INJECTION, SOLUTION INFILTRATION; PERINEURAL PRN
Status: DISCONTINUED | OUTPATIENT
Start: 2024-12-10 | End: 2024-12-10

## 2024-12-10 RX ORDER — FENTANYL CITRATE 50 UG/ML
25-50 INJECTION, SOLUTION INTRAMUSCULAR; INTRAVENOUS
Status: DISCONTINUED | OUTPATIENT
Start: 2024-12-10 | End: 2024-12-10 | Stop reason: HOSPADM

## 2024-12-10 RX ORDER — EPHEDRINE SULFATE 50 MG/ML
INJECTION, SOLUTION INTRAMUSCULAR; INTRAVENOUS; SUBCUTANEOUS PRN
Status: DISCONTINUED | OUTPATIENT
Start: 2024-12-10 | End: 2024-12-10

## 2024-12-10 RX ORDER — ONDANSETRON 2 MG/ML
4 INJECTION INTRAMUSCULAR; INTRAVENOUS EVERY 30 MIN PRN
Status: DISCONTINUED | OUTPATIENT
Start: 2024-12-10 | End: 2024-12-10 | Stop reason: HOSPADM

## 2024-12-10 RX ORDER — DEXMEDETOMIDINE HYDROCHLORIDE 4 UG/ML
INJECTION, SOLUTION INTRAVENOUS
Status: COMPLETED | OUTPATIENT
Start: 2024-12-10 | End: 2024-12-10

## 2024-12-10 RX ORDER — KETOROLAC TROMETHAMINE 30 MG/ML
INJECTION, SOLUTION INTRAMUSCULAR; INTRAVENOUS PRN
Status: DISCONTINUED | OUTPATIENT
Start: 2024-12-10 | End: 2024-12-10

## 2024-12-10 RX ORDER — BUPIVACAINE HYDROCHLORIDE AND EPINEPHRINE 5; 5 MG/ML; UG/ML
INJECTION, SOLUTION PERINEURAL PRN
Status: DISCONTINUED | OUTPATIENT
Start: 2024-12-10 | End: 2024-12-10 | Stop reason: HOSPADM

## 2024-12-10 RX ORDER — MAGNESIUM HYDROXIDE 1200 MG/15ML
LIQUID ORAL PRN
Status: DISCONTINUED | OUTPATIENT
Start: 2024-12-10 | End: 2024-12-10 | Stop reason: HOSPADM

## 2024-12-10 RX ORDER — OXYCODONE HYDROCHLORIDE 5 MG/1
5 TABLET ORAL
Status: DISCONTINUED | OUTPATIENT
Start: 2024-12-10 | End: 2024-12-10 | Stop reason: HOSPADM

## 2024-12-10 RX ORDER — PROPOFOL 10 MG/ML
INJECTION, EMULSION INTRAVENOUS PRN
Status: DISCONTINUED | OUTPATIENT
Start: 2024-12-10 | End: 2024-12-10

## 2024-12-10 RX ORDER — NALOXONE HYDROCHLORIDE 0.4 MG/ML
0.1 INJECTION, SOLUTION INTRAMUSCULAR; INTRAVENOUS; SUBCUTANEOUS
Status: DISCONTINUED | OUTPATIENT
Start: 2024-12-10 | End: 2024-12-10 | Stop reason: HOSPADM

## 2024-12-10 RX ORDER — AMOXICILLIN 250 MG
1-2 CAPSULE ORAL 2 TIMES DAILY
Qty: 30 TABLET | Refills: 0 | Status: SHIPPED | OUTPATIENT
Start: 2024-12-10

## 2024-12-10 RX ORDER — FENTANYL CITRATE 50 UG/ML
50 INJECTION, SOLUTION INTRAMUSCULAR; INTRAVENOUS EVERY 5 MIN PRN
Status: DISCONTINUED | OUTPATIENT
Start: 2024-12-10 | End: 2024-12-10 | Stop reason: HOSPADM

## 2024-12-10 RX ORDER — ONDANSETRON 2 MG/ML
INJECTION INTRAMUSCULAR; INTRAVENOUS PRN
Status: DISCONTINUED | OUTPATIENT
Start: 2024-12-10 | End: 2024-12-10

## 2024-12-10 RX ORDER — DEXAMETHASONE SODIUM PHOSPHATE 10 MG/ML
INJECTION, SOLUTION INTRAMUSCULAR; INTRAVENOUS
Status: COMPLETED | OUTPATIENT
Start: 2024-12-10 | End: 2024-12-10

## 2024-12-10 RX ORDER — ACETAMINOPHEN 325 MG/1
975 TABLET ORAL ONCE
Status: COMPLETED | OUTPATIENT
Start: 2024-12-10 | End: 2024-12-10

## 2024-12-10 RX ORDER — DEXAMETHASONE SODIUM PHOSPHATE 4 MG/ML
INJECTION, SOLUTION INTRA-ARTICULAR; INTRALESIONAL; INTRAMUSCULAR; INTRAVENOUS; SOFT TISSUE PRN
Status: DISCONTINUED | OUTPATIENT
Start: 2024-12-10 | End: 2024-12-10

## 2024-12-10 RX ORDER — ACETAMINOPHEN 325 MG/1
650 TABLET ORAL
Status: DISCONTINUED | OUTPATIENT
Start: 2024-12-10 | End: 2024-12-10 | Stop reason: HOSPADM

## 2024-12-10 RX ORDER — CEFAZOLIN SODIUM/WATER 2 G/20 ML
2 SYRINGE (ML) INTRAVENOUS
Status: DISCONTINUED | OUTPATIENT
Start: 2024-12-10 | End: 2024-12-10 | Stop reason: HOSPADM

## 2024-12-10 RX ORDER — SODIUM CHLORIDE, SODIUM LACTATE, POTASSIUM CHLORIDE, CALCIUM CHLORIDE 600; 310; 30; 20 MG/100ML; MG/100ML; MG/100ML; MG/100ML
INJECTION, SOLUTION INTRAVENOUS CONTINUOUS PRN
Status: DISCONTINUED | OUTPATIENT
Start: 2024-12-10 | End: 2024-12-10

## 2024-12-10 RX ORDER — OXYCODONE HYDROCHLORIDE 5 MG/1
5-10 TABLET ORAL EVERY 4 HOURS PRN
Qty: 20 TABLET | Refills: 0 | Status: SHIPPED | OUTPATIENT
Start: 2024-12-10

## 2024-12-10 RX ORDER — HYDROXYZINE HYDROCHLORIDE 25 MG/1
25 TABLET, FILM COATED ORAL 3 TIMES DAILY PRN
Qty: 20 TABLET | Refills: 0 | Status: SHIPPED | OUTPATIENT
Start: 2024-12-10

## 2024-12-10 RX ORDER — NALOXONE HYDROCHLORIDE 0.4 MG/ML
0.2 INJECTION, SOLUTION INTRAMUSCULAR; INTRAVENOUS; SUBCUTANEOUS
Status: DISCONTINUED | OUTPATIENT
Start: 2024-12-10 | End: 2024-12-10 | Stop reason: HOSPADM

## 2024-12-10 RX ORDER — FENTANYL CITRATE 50 UG/ML
INJECTION, SOLUTION INTRAMUSCULAR; INTRAVENOUS PRN
Status: DISCONTINUED | OUTPATIENT
Start: 2024-12-10 | End: 2024-12-10

## 2024-12-10 RX ORDER — OXYCODONE HYDROCHLORIDE 5 MG/1
5 TABLET ORAL
Status: COMPLETED | OUTPATIENT
Start: 2024-12-10 | End: 2024-12-10

## 2024-12-10 RX ORDER — ONDANSETRON 4 MG/1
4 TABLET, ORALLY DISINTEGRATING ORAL
Status: DISCONTINUED | OUTPATIENT
Start: 2024-12-10 | End: 2024-12-10 | Stop reason: HOSPADM

## 2024-12-10 RX ORDER — VANCOMYCIN HYDROCHLORIDE 1 G/20ML
INJECTION, POWDER, LYOPHILIZED, FOR SOLUTION INTRAVENOUS PRN
Status: DISCONTINUED | OUTPATIENT
Start: 2024-12-10 | End: 2024-12-10 | Stop reason: HOSPADM

## 2024-12-10 RX ORDER — ONDANSETRON 4 MG/1
4 TABLET, ORALLY DISINTEGRATING ORAL EVERY 8 HOURS PRN
Qty: 4 TABLET | Refills: 0 | Status: SHIPPED | OUTPATIENT
Start: 2024-12-10

## 2024-12-10 RX ORDER — LIDOCAINE 40 MG/G
CREAM TOPICAL
Status: DISCONTINUED | OUTPATIENT
Start: 2024-12-10 | End: 2024-12-10 | Stop reason: HOSPADM

## 2024-12-10 RX ORDER — HYDROMORPHONE HYDROCHLORIDE 1 MG/ML
0.2 INJECTION, SOLUTION INTRAMUSCULAR; INTRAVENOUS; SUBCUTANEOUS EVERY 5 MIN PRN
Status: DISCONTINUED | OUTPATIENT
Start: 2024-12-10 | End: 2024-12-10 | Stop reason: HOSPADM

## 2024-12-10 RX ORDER — HYDROXYZINE HYDROCHLORIDE 25 MG/1
25 TABLET, FILM COATED ORAL
Status: DISCONTINUED | OUTPATIENT
Start: 2024-12-10 | End: 2024-12-10 | Stop reason: HOSPADM

## 2024-12-10 RX ORDER — HYDROMORPHONE HYDROCHLORIDE 1 MG/ML
0.4 INJECTION, SOLUTION INTRAMUSCULAR; INTRAVENOUS; SUBCUTANEOUS EVERY 5 MIN PRN
Status: DISCONTINUED | OUTPATIENT
Start: 2024-12-10 | End: 2024-12-10 | Stop reason: HOSPADM

## 2024-12-10 RX ORDER — BUPIVACAINE HYDROCHLORIDE 2.5 MG/ML
INJECTION, SOLUTION EPIDURAL; INFILTRATION; INTRACAUDAL
Status: COMPLETED | OUTPATIENT
Start: 2024-12-10 | End: 2024-12-10

## 2024-12-10 RX ORDER — TRANEXAMIC ACID 10 MG/ML
1 INJECTION, SOLUTION INTRAVENOUS ONCE
Status: COMPLETED | OUTPATIENT
Start: 2024-12-10 | End: 2024-12-10

## 2024-12-10 RX ADMIN — PROPOFOL 50 MG: 10 INJECTION, EMULSION INTRAVENOUS at 08:30

## 2024-12-10 RX ADMIN — DEXAMETHASONE SODIUM PHOSPHATE 2 MG: 10 INJECTION, SOLUTION INTRAMUSCULAR; INTRAVENOUS at 07:50

## 2024-12-10 RX ADMIN — PROPOFOL 150 MG: 10 INJECTION, EMULSION INTRAVENOUS at 08:17

## 2024-12-10 RX ADMIN — HYDROMORPHONE HYDROCHLORIDE 0.5 MG: 1 INJECTION, SOLUTION INTRAMUSCULAR; INTRAVENOUS; SUBCUTANEOUS at 10:46

## 2024-12-10 RX ADMIN — PHENYLEPHRINE HYDROCHLORIDE 100 MCG: 10 INJECTION INTRAVENOUS at 09:07

## 2024-12-10 RX ADMIN — LIDOCAINE HYDROCHLORIDE 100 MG: 20 INJECTION, SOLUTION INFILTRATION; PERINEURAL at 08:17

## 2024-12-10 RX ADMIN — Medication 2 G: at 08:31

## 2024-12-10 RX ADMIN — DEXMEDETOMIDINE HYDROCHLORIDE 4 MCG: 100 INJECTION, SOLUTION INTRAVENOUS at 10:17

## 2024-12-10 RX ADMIN — OXYCODONE HYDROCHLORIDE 5 MG: 5 TABLET ORAL at 12:49

## 2024-12-10 RX ADMIN — MIDAZOLAM 1 MG: 1 INJECTION INTRAMUSCULAR; INTRAVENOUS at 07:54

## 2024-12-10 RX ADMIN — DEXMEDETOMIDINE HYDROCHLORIDE 20 MCG: 4 INJECTION, SOLUTION INTRAVENOUS at 07:50

## 2024-12-10 RX ADMIN — FENTANYL CITRATE 50 MCG: 50 INJECTION INTRAMUSCULAR; INTRAVENOUS at 08:16

## 2024-12-10 RX ADMIN — KETOROLAC TROMETHAMINE 30 MG: 30 INJECTION, SOLUTION INTRAMUSCULAR at 10:38

## 2024-12-10 RX ADMIN — SODIUM CHLORIDE, POTASSIUM CHLORIDE, SODIUM LACTATE AND CALCIUM CHLORIDE: 600; 310; 30; 20 INJECTION, SOLUTION INTRAVENOUS at 08:17

## 2024-12-10 RX ADMIN — EPHEDRINE SULFATE 5 MG: 5 INJECTION INTRAVENOUS at 09:28

## 2024-12-10 RX ADMIN — Medication 20 MG: at 08:36

## 2024-12-10 RX ADMIN — ONDANSETRON 4 MG: 2 INJECTION INTRAMUSCULAR; INTRAVENOUS at 10:38

## 2024-12-10 RX ADMIN — BUPIVACAINE HYDROCHLORIDE 20 ML: 2.5 INJECTION, SOLUTION EPIDURAL; INFILTRATION; INTRACAUDAL; PERINEURAL at 07:50

## 2024-12-10 RX ADMIN — DEXAMETHASONE SODIUM PHOSPHATE 4 MG: 4 INJECTION, SOLUTION INTRAMUSCULAR; INTRAVENOUS at 09:15

## 2024-12-10 RX ADMIN — DEXMEDETOMIDINE HYDROCHLORIDE 8 MCG: 100 INJECTION, SOLUTION INTRAVENOUS at 11:06

## 2024-12-10 RX ADMIN — FENTANYL CITRATE 50 MCG: 50 INJECTION INTRAMUSCULAR; INTRAVENOUS at 07:53

## 2024-12-10 RX ADMIN — DEXMEDETOMIDINE HYDROCHLORIDE 8 MCG: 100 INJECTION, SOLUTION INTRAVENOUS at 10:38

## 2024-12-10 RX ADMIN — PROPOFOL 50 MCG/KG/MIN: 10 INJECTION, EMULSION INTRAVENOUS at 08:43

## 2024-12-10 RX ADMIN — TRANEXAMIC ACID 1 G: 10 INJECTION, SOLUTION INTRAVENOUS at 08:31

## 2024-12-10 RX ADMIN — PHENYLEPHRINE HYDROCHLORIDE 100 MCG: 10 INJECTION INTRAVENOUS at 09:04

## 2024-12-10 RX ADMIN — Medication 50 MG: at 08:19

## 2024-12-10 RX ADMIN — FENTANYL CITRATE 50 MCG: 50 INJECTION INTRAMUSCULAR; INTRAVENOUS at 10:40

## 2024-12-10 RX ADMIN — PROPOFOL 50 MG: 10 INJECTION, EMULSION INTRAVENOUS at 08:19

## 2024-12-10 RX ADMIN — PROPOFOL 50 MG: 10 INJECTION, EMULSION INTRAVENOUS at 08:25

## 2024-12-10 RX ADMIN — ACETAMINOPHEN 975 MG: 325 TABLET, FILM COATED ORAL at 06:42

## 2024-12-10 ASSESSMENT — ACTIVITIES OF DAILY LIVING (ADL)
ADLS_ACUITY_SCORE: 32

## 2024-12-10 NOTE — ANESTHESIA PROCEDURE NOTES
"Adductor canal Procedure Note    Pre-Procedure   Staff -        Anesthesiologist:  Harpreet Holland DO       Resident/Fellow: Tamia Alexis MD       Performed By: resident       Location: pre-op       Pre-Anesthestic Checklist: patient identified, IV checked, site marked, risks and benefits discussed, informed consent, monitors and equipment checked, pre-op evaluation, at physician/surgeon's request and post-op pain management  Timeout:       Correct Patient: Yes        Correct Procedure: Yes        Correct Site: Yes        Correct Position: Yes        Correct Laterality: Yes        Site Marked: Yes  Procedure Documentation  Procedure: Adductor canal       Laterality: left       Patient Position: supine       Skin prep: Chloraprep       Needle Gauge: 21.        Needle Length (millimeters): 110        Catheter: 21 G.                1. Ultrasound was used to identify targeted nerve, plexus, vascular marker, or fascial plane and place a needle adjacent to it in real-time.       2. Ultrasound was used to visualize the spread of anesthetic in close proximity to the above referenced structure.       3. A permanent image is entered into the patient's record.    Assessment/Narrative         The placement was negative for: blood aspirated, painful injection and site bleeding       Paresthesias: No.       Bolus given via needle. no blood aspirated via catheter.        Secured via.        Insertion/Infusion Method: Single Shot       Complications: none    Medication(s) Administered   Bupivacaine 0.25% PF (Infiltration) - Infiltration   20 mL - 12/10/2024 7:50:00 AM  Dexmedetomidine 4 mcg/mL (Perineural) - Perineural   20 mcg - 12/10/2024 7:50:00 AM  Dexamethasone 10 mg/mL PF (Perineural) - Perineural   2 mg - 12/10/2024 7:50:00 AM    FOR [facility] ([facility]) ONLY:   Pain Team Contact information: please page the Pain Team Via [employer]. Search \"Pain\". During daytime hours, please page the attending first. At night please " page the resident first.

## 2024-12-10 NOTE — OP NOTE
PREOPERATIVE DIAGNOSIS:   Recurrent patellar instability left knee  Large loose body left knee  Patellar and trochlear chondrosis left knee    POSTOPERATIVE DIAGNOSIS:  Recurrent patellar instability left knee  Large loose body left knee  Patellar chondrosis 2 x 2 cm area grade 4  Trochlear chondrosis 2 x 2 cm area grade 4  Tight lateral retinaculum    PROCEDURE:  Examination under anesthesia left knee  Left knee arthroscopy  Chondroplasty of the patella  Chondroplasty of the trochlea  Loose body removal requiring creation of a superior lateral portal  Autologous chondrocyte implantation biopsy  Tibial tubercle osteotomy with anterior medialization  Medial patellofemoral ligament reconstruction with allograft  Medial retinacular imbrication  Lateral retinacular lengthening    DATE OF SURGERY: 12/10/2024    SURGEON: Jorge Toure MD    ASSISTANT: Domi Mahoney PA-C. The assistance of Mrs. Mahoney was necessary for positioning, arthroscopic visualization, retraction, graft preparation and graft passage. There was no qualified available resident or fellow who was aware of the intricies of the procedure and requirements of graft preparation.      RESIDENT OR FELLOW: Michael Valdez MD    OPERATIVE INDICATIONS: Suresh Will is a pleasant 19 year old who I saw through my orthopedic clinic with a history, physical, imaging consistent with left knee pain swelling and recurrent patellar instability.  He was seen through my clinic we discussed my thoughts regarding cartilage reconstruction we discussed demonstrating patellar instability as well as symptoms from patellar chondrosis.  He understood the potential staged approach of my thoughts on cartilage reconstruction and he desired to proceed..  I reviewed with the patient the risks, benefits, complications, techniques and alternatives to surgery.  We reviewed the expected course of recovery and the potential expected outcomes.  The patient understood both the risks  and benefits and desired to proceed despite the risks.    OPERATIVE DETAILS: In the preoperative area the patient's informed consent was reviewed and they desired to proceed.  The left leg was marked and the patient was in agreement.  The patient was taken to the operating room where a timeout was performed and all parties were in agreement.  Preoperative antibiotics were given within 1 hour of the time of incision.  The patient was placed in the supine position and surrendered to LMA anesthesia.  No tourniquet was applied.  Egg crate was placed beneath the well leg and a side post was utilized.  The operative leg was prepped and draped in the usual sterile fashion.     Examination Under Anesthesia:    0 to 135 degrees.  Stable to varus valgus stress testing.  Stable anterior posterior drawer testing.  No pivot shift.  Lachman 0.  Tilt to 10 degrees below neutral.    Anteromedial and anterolateral scope portals were created and a diagnostic arthroscopy was performed with the following findings: Medial femoral condyle, medial tibial plateau and medial meniscus normal.  Lateral femoral condyle lateral tibial plateau lateral meniscus normal.  ACL PCL normal.  Large loose body within the suprapatellar pouch.  The grade 4 change of the lateral trochlea with normal central trochlea and medial trochlea.  2 x 2 cm.  Grade 4 change of the patella 2 x 2 cm.  Unstable cartilage flaps are present in both places.    This time a shaver was introduced and a chondroplasty patella was performed till balanced stable rim of cartilage remained.  Then a chondroplasty of the kirsten was performed until about stable rim of cartilage remained.  Then the superior lateral portal was enlarged and a grasper was introduced and this large loose body was taken out without complication.  At this time we utilized a ring curette and approximately 300 mg of bone and cartilage were harvested from the intercondylar notch without complication.  At this  time the arthroscopic was withdrawn and all excess fluid and chondral debris was removed from the knee joint motorized suction.    At this time we proceeded with the open medial patellofemoral ligament reconstruction.  The lateral portal was closed with 3-0 nylon suture.  The medial portal was enlarged and carried down through the skin and subcutaneous tissues and meticulous hemostasis was ensured.  We raised suprafascial flaps and opened the medial retinacular fascia until we could define a plane between layers 2 and 3.  An Adson point hemostat was placed into this space and an imbrication suture was placed in the medial retinaculum of #2 Fiberwire.  At this time the Bovie electrocautery was used to expose the medial border of the patella in approximately the upper third.  We then placed 2, 3 mm suturetak suture anchors in the upper third of the patella.  Tensioning along the sutures provided excellent control of the patella.    We then proceeded with  tibial tubercle osteotomy.  A 7 cm incision was made over the tibial tubercle it was carried out through the skin and subcutaneous tissues.  Meticulous hemostasis was ensured.  Subcutaneous flaps were elevated.  We then marked our proposed resection site on the medial side first with the Bovie.  We plan for approximately 8 mm of medialization up a 60 degree slope given the amount of cartilage wear.  We then reflected the lateral compartment till we could adequately visualize the lateral compartment.  2, 0.062 K wire wires were then placed in the medial to lateral direction with care taken to the taper the cut out the anterior cortex to prevent any sharp step-offs.  We then completed our osteotomy with an oscillating saw and osteotome.  The osteotomy fragment was taken free.  Rasping along the cut surface was then performed.  Anteromedial sedation up our 60 degree slope was then completed and it was held in place with 2, 0.062 K wires.  These were then replaced with  3, 3.5 millimeter screws.  Final tensioning retention was performed and C-arm imaging showed good position the osteotomy and the screws.    At this time a perfect lateral x-ray was obtained and we identified the anatomic insertion of the femoral origin of the medial patellofemoral ligament.  We localized our skin incision, opened the fascia longitudinally and placed a Beath pin under radiographic control.  This Beath pin was then advanced across the knee in a proximal and anterior direction.  We reamed a 7 mm socket.     At this time we prepared our semitendinosis allograft by thawing and then placing running locking fiberloop on each tail. It was seen to fit through a size 6.5 sizing guide. It as tensioned at 20 lbs for 20 min to remove any creep     The graft was brought up and reduced through the femoral tunnel where we fixed it in place with a 7 mm biocomposite screw which had excellent purchase. The graft was routed deep to the fascia through the previously created channel. The knee was placed at 30 degrees of flexion, neutral rotation and we confirmed that the patella was well seated within the trochlear groove. A needle was used to suture it in place to the medial border of the patella. The sutures from each anchor were then tied to each other further compressing the graft to the patella. At this time a free needle was use to complete the medial imbrication by tensioning the medial retinaculum on to the anterior border of the patella. Finally the remainder of the graft was reduced below the anterior periosteum of the patella and sutured in place with 0-vicryl suture.     And tensioning and retention of screws were performed and we bone grafted the tibial tubercle osteotomy site.    An examination was then performed. 1 quadrant lateral translation of the patella, good checkrein with a firm endpoint. Full motion of the knee.    Copious irrigation was performed an a layered closure was initiated, sterile  dressings were applied and the patient was transferred to the recovery room in stable condition with stable vital signs.    ESTIMATED BLOOD LOSS: 25 mL.    TOURNIQUET TIME: No tourniquet was placed.    COMPLICATIONS: None apparent.    DRAINS: None.    SPECIMENS: None.     POSTOPERATIVE PLAN:  Hinged knee brace on and locked at 20 degrees for 1 week.  At 1 week brace off or unlocked for sitting or therapy.  On and locked when up and around until 6 weeks  Toe-touch weightbearing x 6 weeks  Aspirin 162 mg daily for 4 weeks  Shower on day 3.  No submerging the wounds  AP and lateral radiographs at 6 weeks and 12 weeks  No requirement ever to proceed with cartilage reconstruction if the patient should have persistent pain and swelling could consider reimplantation of patella and trochlea

## 2024-12-10 NOTE — ANESTHESIA CARE TRANSFER NOTE
Patient: Suresh Will    Procedure: Procedure(s):  Examination under anesthesia, knee arthroscopy, chondroplasty, loose body removal, autologous chondrocyte implantation biopsy, Tibial tubercle osteotomy and lateral lengthening, Medial patellofemoral ligament reconstruction with allograft       Diagnosis: Chronic pain of left knee [M25.562, G89.29]  Diagnosis Additional Information: No value filed.    Anesthesia Type:   General     Note:    Oropharynx: oropharynx clear of all foreign objects and spontaneously breathing  Level of Consciousness: drowsy  Oxygen Supplementation: face mask  Level of Supplemental Oxygen (L/min / FiO2): 8  Independent Airway: airway patency satisfactory and stable  Dentition: dentition unchanged  Vital Signs Stable: post-procedure vital signs reviewed and stable  Report to RN Given: handoff report given  Patient transferred to: PACU    Handoff Report: Identifed the Patient, Identified the Reponsible Provider, Reviewed the pertinent medical history, Discussed the surgical course, Reviewed Intra-OP anesthesia mangement and issues during anesthesia, Set expectations for post-procedure period and Allowed opportunity for questions and acknowledgement of understanding      Vitals:  CRNA VITALS  12/10/2024 1044 - 12/10/2024 1118        12/10/2024             NIBP: 98/78    Pulse: 65    NIBP Mean: 84    Temp: 37.5  C (99.5  F)    SpO2: 99 %    Resp Rate (observed): 12             Electronically Signed By: LIYA Asencio CRNA  December 10, 2024  11:18 AM

## 2024-12-10 NOTE — ANESTHESIA PROCEDURE NOTES
Airway       Patient location during procedure: OR       Procedure Start/Stop Times: 12/10/2024 8:22 AM  Staff -        Anesthesiologist:  Timothy Easley MD       Resident/Fellow: Fatou Higgins MD       Performed By: resident and with residents       Procedure performed by resident/fellow/CRNA in presence of a teaching physician.    Consent for Airway        Urgency: elective  Indications and Patient Condition       Indications for airway management: hellen-procedural       Induction type:intravenous       Mask difficulty assessment: 2 - vent by mask + OA or adjuvant +/- NMBA    Final Airway Details       Final airway type: endotracheal airway       Successful airway: ETT - single  Endotracheal Airway Details        ETT size (mm): 7.5       Cuffed: yes       Successful intubation technique: video laryngoscopy       VL Blade Size: MAC 3       Grade View of Cords: 1       Adjucts: stylet       Position: Right       Measured from: lips       Secured at (cm): 25       Bite block used: Soft    Post intubation assessment        Placement verified by: capnometry, equal breath sounds and chest rise        Number of attempts at approach: 1       Number of other approaches attempted: 0       Secured with: tape       Ease of procedure: easy       Dentition: Intact and Unchanged    Medication(s) Administered   Medication Administration Time: 12/10/2024 8:22 AM

## 2024-12-10 NOTE — ANESTHESIA POSTPROCEDURE EVALUATION
Patient: Suresh Will    Procedure: Procedure(s):  Examination under anesthesia, knee arthroscopy, chondroplasty, loose body removal, autologous chondrocyte implantation biopsy, Tibial tubercle osteotomy and lateral lengthening, Medial patellofemoral ligament reconstruction with allograft       Anesthesia Type:  General    Note:  Disposition: Outpatient   Postop Pain Control: Uneventful            Sign Out: Well controlled pain   PONV: No   Neuro/Psych: Uneventful            Sign Out: Acceptable/Baseline neuro status   Airway/Respiratory: Uneventful            Sign Out: Acceptable/Baseline resp. status   CV/Hemodynamics: Uneventful            Sign Out: Acceptable CV status; No obvious hypovolemia; No obvious fluid overload   Other NRE: NONE   DID A NON-ROUTINE EVENT OCCUR?            Last vitals:  Vitals Value Taken Time   BP 91/43 12/10/24 1130   Temp 37.5  C (99.5  F) 12/10/24 1117   Pulse 85 12/10/24 1136   Resp 18 12/10/24 1136   SpO2 96 % 12/10/24 1136   Vitals shown include unfiled device data.    Electronically Signed By: Timothy Easley MD  December 10, 2024  11:37 AM

## 2024-12-10 NOTE — BRIEF OP NOTE
Orthopaedic Surgery Brief Op-Note      Patient: Suresh Will  : 2005  Date of Service: 12/10/2024 11:57 AM    Pre-operative Diagnosis: Chronic pain of left knee [M25.562, G89.29]  Post-operative Diagnosis: same    Procedure(s) Performed: Procedure(s):  Examination under anesthesia, knee arthroscopy, chondroplasty, loose body removal, autologous chondrocyte implantation biopsy, Tibial tubercle osteotomy and lateral lengthening, Medial patellofemoral ligament reconstruction with allograft    Staff: Dr. Toure  Assistants:   JACQUELINE Gavin MD    Anesthesia: General  EBL: 25 cc      Implants:   Implant Name Type Inv. Item Serial No.  Lot No. LRB No. Used Action   GRAFT TENDON SEMITENDINOSUS 26CM [id number] - [id number] Bone/Tissue/Biologic GRAFT TENDON SEMITENDINOSUS 26CM [id number] [employer]CULOSKELET[employer]  Left 1 Implanted   IMP SCR ARTHREX BIOCOMP INTERF FAST THRD 7X20MM AR-4020C-07 - [id number] Metallic Hardware/[place] IMP SCR ARTHREX BIOCOMP INTERF FAST THRD 7X20MM AR-4020C-07  AR[employer]X [id number] Left 1 Implanted   GRAFT BONE FIBERS HAYDEE DBM DBF 6ML [id number] - [id number] Bone/Tissue/Biologic GRAFT BONE FIBERS HAYDEE DBM DBF 6ML [id number] [employer]  Left 1 Implanted     Drains: none  Intra-op Labs/Cxs/Specimens: None  Complications: No apparent complications during procedure  Findings: Please see dictated operative note for details    Disposition: Stable to PACU, then discharge to home    Post-Op Plan:  Assessment/Plan: Suresh Will is a 19 year old male s/p Procedure(s):  Examination under anesthesia, knee arthroscopy, chondroplasty, loose body removal, autologous chondrocyte implantation biopsy, Tibial tubercle osteotomy and lateral lengthening, Medial patellofemoral ligament reconstruction with allograft on 12/10/2024 with Dr. Toure    Activity: Up with assist and assistive devices as needed until independent.    Weight bearing status:  TTWB with hinged knee brace on and locked at 20 degrees. Ok to unlock or remove brace while seated or with therapy   Antibiotics: Perioperative ancef   Diet: Begin with clear fluids and progress diet as tolerated. Bowel regimen. Anti-emetics PRN.    DVT prophylaxis:  ASA 81 mg BID x 4 weeks    Wound Care: Tegaderm and alginate x 2 weeks       Future Appointments   Date Time Provider Department Center   12/19/2024 10:20 AM Jennifer Myles PA-C [facility]   1/22/2025  8:30 AM Jorge Toure MD [facility]       Domi Mahoney PA-C 12/10/2024 11:57 AM  Orthopaedic Surgery     Please page me at [phone] with any questions/concerns during regular weekday hours before 5pm. If there is no response, if it is a weekend, or if it is during evening hours then please page the orthopaedic surgery resident on call.

## 2024-12-19 ENCOUNTER — OFFICE VISIT (OUTPATIENT)
Dept: ORTHOPEDICS | Facility: CLINIC | Age: 19
End: 2024-12-19
Payer: COMMERCIAL

## 2024-12-19 DIAGNOSIS — M25.562 CHRONIC PAIN OF LEFT KNEE: Primary | ICD-10-CM

## 2024-12-19 DIAGNOSIS — G89.29 CHRONIC PAIN OF LEFT KNEE: Primary | ICD-10-CM

## 2024-12-19 NOTE — LETTER
12/19/2024      Suresh Will  [address]  [place] [place] [address]      Dear Colleague,    Thank you for referring your patient, Suresh Will, to the [facility] [place]. Please see a copy of my visit note below.    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  Tight lateral retinaculum     PROCEDURE:  Examination under anesthesia left knee  Left knee arthroscopy  Chondroplasty of the patella  Chondroplasty of the trochlea  Loose body removal requiring creation of a superior lateral portal  Autologous chondrocyte implantation biopsy  Tibial tubercle osteotomy with anterior medialization  Medial patellofemoral ligament reconstruction with allograft  Medial retinacular imbrication  Lateral retinacular lengthening     DATE OF SURGERY: 12/10/2024  SURGEON: Jorge Toure MD    HPI: Suresh is a 19-year-old young man here today for follow-up 9 days status post above procedures.  Patient reports overall he is doing well.  He is only taking Tylenol for pain and feels his pain is well-controlled.  He is icing and elevating.  He is toe-touch weightbearing and using his hinged knee brace.  He has physical therapy lined up for at home in [place] over [facility].  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  No other concerns.    Physical Exam: Suresh is a 19-year-old young man who is alert and oriented no apparent distress.  He is toe-touch weightbearing with crutches.  His brace and dressings were removed today.  All incisions are healing well with no erythema or drainage.  Suture tails were trimmed today.  He has moderate swelling of the knee and lower leg.  No calf tenderness.  +1 edema of the left lower leg.  He has full plantarflexion and dorsiflexion.  He is neurovascular intact  distally.    Imaging:- We did review the patient's intra-operative images today.    Impression: 19-year-old male doing well status post left knee tibial tubercle osteotomy with anterior medialization, MPFL reconstruction, chondroplasty, and loose body removal    Plan: Patient can shower and get the incisions wet.  No soaking in a tub or pool for 2 weeks.  OK to use vitamin E oil or cocoa butter on the incisions in 2 weeks.  Steri-Strips were placed over the wound.  Cover incisions as needed for comfort.  Continue to ice and elevate for swelling control.  Begin working with PT for protocol.  Follow-up in 1 month for check of progress.  Long-term post-op plan was reviewed below.  All questions were answered today.    Hinged knee brace on and locked at 20 degrees for 1 week.  At 1 week brace off or unlocked for sitting or therapy.  On and locked when up and around until 6 weeks  Toe-touch weightbearing x 6 weeks  Aspirin 162 mg daily for 4 weeks  Shower on day 3.  No submerging the wounds  AP and lateral radiographs at 6 weeks and 12 weeks  No requirement ever to proceed with cartilage reconstruction if the patient should have persistent pain and swelling could consider reimplantation of patella and trochlea      Again, thank you for allowing me to participate in the care of your patient.        Sincerely,        Jennifer Myles PA-C

## 2024-12-19 NOTE — PROGRESS NOTES
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  Tight lateral retinaculum     PROCEDURE:  Examination under anesthesia left knee  Left knee arthroscopy  Chondroplasty of the patella  Chondroplasty of the trochlea  Loose body removal requiring creation of a superior lateral portal  Autologous chondrocyte implantation biopsy  Tibial tubercle osteotomy with anterior medialization  Medial patellofemoral ligament reconstruction with allograft  Medial retinacular imbrication  Lateral retinacular lengthening     DATE OF SURGERY: 12/10/2024  SURGEON: Jorge Toure MD    HPI: Suresh is a 19-year-old young man here today for follow-up 9 days status post above procedures.  Patient reports overall he is doing well.  He is only taking Tylenol for pain and feels his pain is well-controlled.  He is icing and elevating.  He is toe-touch weightbearing and using his hinged knee brace.  He has physical therapy lined up for at home in [place] over [facility].  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  No other concerns.    Physical Exam: Suresh is a 19-year-old young man who is alert and oriented no apparent distress.  He is toe-touch weightbearing with crutches.  His brace and dressings were removed today.  All incisions are healing well with no erythema or drainage.  Suture tails were trimmed today.  He has moderate swelling of the knee and lower leg.  No calf tenderness.  +1 edema of the left lower leg.  He has full plantarflexion and dorsiflexion.  He is neurovascular intact distally.    Imaging:- We did review the patient's intra-operative images today.    Impression: 19-year-old male doing well status post left knee tibial tubercle osteotomy with anterior medialization, MPFL reconstruction, chondroplasty, and loose body removal    Plan:  Patient can shower and get the incisions wet.  No soaking in a tub or pool for 2 weeks.  OK to use vitamin E oil or cocoa butter on the incisions in 2 weeks.  Steri-Strips were placed over the wound.  Cover incisions as needed for comfort.  Continue to ice and elevate for swelling control.  Begin working with PT for protocol.  Follow-up in 1 month for check of progress.  Long-term post-op plan was reviewed below.  All questions were answered today.    Hinged knee brace on and locked at 20 degrees for 1 week.  At 1 week brace off or unlocked for sitting or therapy.  On and locked when up and around until 6 weeks  Toe-touch weightbearing x 6 weeks  Aspirin 162 mg daily for 4 weeks  Shower on day 3.  No submerging the wounds  AP and lateral radiographs at 6 weeks and 12 weeks  No requirement ever to proceed with cartilage reconstruction if the patient should have persistent pain and swelling could consider reimplantation of patella and trochlea

## 2024-12-19 NOTE — NURSING NOTE
Reason For Visit:   Chief Complaint   Patient presents with    RECHECK     Patient returns 9d s/p left knee arthroscopy, chondroplasty, loose body removal, autologous chondrocyte implantation biopsy, tibial tubercle osteotomy and lateral lengthening, MPFL/R.  He is doing well today, and managing pain well.  He denies fevers/chills/illness since date of surgery.       There were no vitals taken for this visit.    Pain Assessment  Patient Currently in Pain: Yes  0-10 Pain Scale: 1  Primary Pain Location: Knee    Kishor Guido, ATC

## 2025-01-16 DIAGNOSIS — Z98.890 S/P LEFT KNEE SURGERY: Primary | ICD-10-CM

## 2025-01-20 ENCOUNTER — OFFICE VISIT (OUTPATIENT)
Dept: ORTHOPEDICS | Facility: CLINIC | Age: 20
End: 2025-01-20
Payer: COMMERCIAL

## 2025-01-20 ENCOUNTER — ANCILLARY PROCEDURE (OUTPATIENT)
Dept: GENERAL RADIOLOGY | Facility: CLINIC | Age: 20
End: 2025-01-20
Attending: ORTHOPAEDIC SURGERY
Payer: COMMERCIAL

## 2025-01-20 DIAGNOSIS — Z98.890 S/P LEFT KNEE SURGERY: Primary | ICD-10-CM

## 2025-01-20 DIAGNOSIS — Z98.890 S/P LEFT KNEE SURGERY: ICD-10-CM

## 2025-01-20 PROCEDURE — 99024 POSTOP FOLLOW-UP VISIT: CPT | Performed by: ORTHOPAEDIC SURGERY

## 2025-01-20 PROCEDURE — 73560 X-RAY EXAM OF KNEE 1 OR 2: CPT | Mod: LT | Performed by: RADIOLOGY

## 2025-01-20 NOTE — LETTER
1/20/2025      Suresh Will  [address]  [facility] [address]      Dear Colleague,    Thank you for referring your patient, Suresh Will, to the [facility] [place]. Please see a copy of my visit note below.    DIAGNOSIS:  Recurrent patellar instability  Trochlear and patellar chondrosis     PROCEDURE:    Chondroplasty of the patella  Chondroplasty of the trochlea  Loose body removal requiring creation of a superior lateral portal  Autologous chondrocyte implantation biopsy  Tibial tubercle osteotomy with anterior medialization  Medial patellofemoral ligament reconstruction with allograft  Medial retinacular imbrication  Lateral retinacular lengthening     DATE OF SURGERY: 12/10/2024      HPI: 6 weeks status post the above.  Pain control.  Off opioids.  Making good progress.  Observant of restrictions.    Physical Exam: Well-healed surgical incisions, no sign of infection  Range of motion 0-100  Stable to varus valgus stress testing.  Stable anterior posterior drawer testing  Neurovascular intact      Imaging:-AP and lateral radiographs obtained today and reviewed by myself show no fracture dislocations and appropriate positioning of tibial tubercle    Impression: 6 weeks status post tibial tubercle osteotomy and MPFL reconstruction    Plan:   Weightbearing as tolerated, wean from crutches when able  Range of motion as tolerated, wean from brace when able  No running jumping or dangerous or at risk activities  Follow-up 6 weeks with AP and lateral radiographs      Again, thank you for allowing me to participate in the care of your patient.      Sincerely,    Jorge Toure MD    Electronically signed

## 2025-01-20 NOTE — NURSING NOTE
Reason For Visit:   Chief Complaint   Patient presents with    Left Knee - Follow Up     DOS: Left Knee arthroscopy, chondroplasty, loose body removal, autologous chondrocyte implantation biopsy, tibial tubercle osteotomy and lateral lengthening, medial patellofemoral ligament reconstruction with allograft.       Date of surgery: 12/10/24  Type of surgery: Examination under anesthesia left knee  Left knee arthroscopy  Chondroplasty of the patella  Chondroplasty of the trochlea  Loose body removal requiring creation of a superior lateral portal  Autologous chondrocyte implantation biopsy  Tibial tubercle osteotomy with anterior medialization  Medial patellofemoral ligament reconstruction with allograft  Medial retinacular imbrication  Lateral retinacular lengthening.      SANE Score  Left Knee: 20  Right Knee: 100         There were no vitals taken for this visit.       No Known Allergies    Current Outpatient Medications   Medication Sig Dispense Refill    acetaminophen (TYLENOL) 325 MG tablet Take 2 tablets (650 mg) by mouth every 4 hours as needed for mild pain. 50 tablet 0    aspirin 81 MG EC tablet Take 1 tablet (81 mg) by mouth 2 times daily. 60 tablet 0    hydrOXYzine HCl (ATARAX) 25 MG tablet Take 1 tablet (25 mg) by mouth 3 times daily as needed for itching. 20 tablet 0    ondansetron (ZOFRAN ODT) 4 MG ODT tab Take 1 tablet (4 mg) by mouth every 8 hours as needed for nausea. 4 tablet 0    oxyCODONE (ROXICODONE) 5 MG tablet Take 1-2 tablets (5-10 mg) by mouth every 4 hours as needed for moderate to severe pain. 20 tablet 0    senna-docusate (SENOKOT-S/PERICOLACE) 8.6-50 MG tablet Take 1-2 tablets by mouth 2 times daily. 30 tablet 0     No current facility-administered medications for this visit.         Dolly Calvo, ATC

## 2025-01-20 NOTE — PROGRESS NOTES
DIAGNOSIS:  Recurrent patellar instability  Trochlear and patellar chondrosis     PROCEDURE:    Chondroplasty of the patella  Chondroplasty of the trochlea  Loose body removal requiring creation of a superior lateral portal  Autologous chondrocyte implantation biopsy  Tibial tubercle osteotomy with anterior medialization  Medial patellofemoral ligament reconstruction with allograft  Medial retinacular imbrication  Lateral retinacular lengthening     DATE OF SURGERY: 12/10/2024      HPI: 6 weeks status post the above.  Pain control.  Off opioids.  Making good progress.  Observant of restrictions.    Physical Exam: Well-healed surgical incisions, no sign of infection  Range of motion 0-100  Stable to varus valgus stress testing.  Stable anterior posterior drawer testing  Neurovascular intact      Imaging:-AP and lateral radiographs obtained today and reviewed by myself show no fracture dislocations and appropriate positioning of tibial tubercle    Impression: 6 weeks status post tibial tubercle osteotomy and MPFL reconstruction    Plan:   Weightbearing as tolerated, wean from crutches when able  Range of motion as tolerated, wean from brace when able  No running jumping or dangerous or at risk activities  Follow-up 6 weeks with AP and lateral radiographs

## 2025-01-24 PROBLEM — M25.562 CHRONIC PAIN OF LEFT KNEE: Status: ACTIVE | Noted: 2025-01-24

## 2025-01-24 PROBLEM — G89.29 CHRONIC PAIN OF LEFT KNEE: Status: ACTIVE | Noted: 2025-01-24

## 2025-01-30 ENCOUNTER — THERAPY VISIT (OUTPATIENT)
Dept: PHYSICAL THERAPY | Facility: CLINIC | Age: 20
End: 2025-01-30
Payer: COMMERCIAL

## 2025-01-30 DIAGNOSIS — M25.562 CHRONIC PAIN OF LEFT KNEE: Primary | ICD-10-CM

## 2025-01-30 DIAGNOSIS — G89.29 CHRONIC PAIN OF LEFT KNEE: Primary | ICD-10-CM

## 2025-02-06 ENCOUNTER — THERAPY VISIT (OUTPATIENT)
Dept: PHYSICAL THERAPY | Facility: CLINIC | Age: 20
End: 2025-02-06
Payer: COMMERCIAL

## 2025-02-06 DIAGNOSIS — M25.562 CHRONIC PAIN OF LEFT KNEE: Primary | ICD-10-CM

## 2025-02-06 DIAGNOSIS — G89.29 CHRONIC PAIN OF LEFT KNEE: Primary | ICD-10-CM

## 2025-02-27 ENCOUNTER — THERAPY VISIT (OUTPATIENT)
Dept: PHYSICAL THERAPY | Facility: CLINIC | Age: 20
End: 2025-02-27
Payer: COMMERCIAL

## 2025-02-27 DIAGNOSIS — M25.562 CHRONIC PAIN OF LEFT KNEE: Primary | ICD-10-CM

## 2025-02-27 DIAGNOSIS — G89.29 CHRONIC PAIN OF LEFT KNEE: Primary | ICD-10-CM

## 2025-03-04 DIAGNOSIS — Z98.890 S/P LEFT KNEE SURGERY: Primary | ICD-10-CM

## 2025-03-05 ENCOUNTER — ANCILLARY PROCEDURE (OUTPATIENT)
Dept: GENERAL RADIOLOGY | Facility: CLINIC | Age: 20
End: 2025-03-05
Attending: ORTHOPAEDIC SURGERY
Payer: COMMERCIAL

## 2025-03-05 ENCOUNTER — THERAPY VISIT (OUTPATIENT)
Dept: PHYSICAL THERAPY | Facility: CLINIC | Age: 20
End: 2025-03-05
Payer: COMMERCIAL

## 2025-03-05 ENCOUNTER — OFFICE VISIT (OUTPATIENT)
Dept: ORTHOPEDICS | Facility: CLINIC | Age: 20
End: 2025-03-05
Payer: COMMERCIAL

## 2025-03-05 VITALS — HEIGHT: 69 IN | WEIGHT: 212 LBS | BODY MASS INDEX: 31.4 KG/M2

## 2025-03-05 DIAGNOSIS — G89.29 CHRONIC PAIN OF LEFT KNEE: Primary | ICD-10-CM

## 2025-03-05 DIAGNOSIS — M25.562 CHRONIC PAIN OF LEFT KNEE: Primary | ICD-10-CM

## 2025-03-05 DIAGNOSIS — Z98.890 S/P LEFT KNEE SURGERY: Primary | ICD-10-CM

## 2025-03-05 DIAGNOSIS — Z98.890 S/P LEFT KNEE SURGERY: ICD-10-CM

## 2025-03-05 PROCEDURE — 99024 POSTOP FOLLOW-UP VISIT: CPT | Performed by: ORTHOPAEDIC SURGERY

## 2025-03-05 PROCEDURE — 73560 X-RAY EXAM OF KNEE 1 OR 2: CPT | Mod: LT | Performed by: RADIOLOGY

## 2025-03-05 PROCEDURE — 97110 THERAPEUTIC EXERCISES: CPT | Mod: GP

## 2025-03-05 NOTE — PROGRESS NOTES
DIAGNOSIS:  Recurrent patellar instability  Trochlear and patellar chondrosis     PROCEDURE:    Chondroplasty of the patella  Chondroplasty of the trochlea  Loose body removal requiring creation of a superior lateral portal  Autologous chondrocyte implantation biopsy  Tibial tubercle osteotomy with anterior medialization  Medial patellofemoral ligament reconstruction with allograft  Medial retinacular imbrication  Lateral retinacular lengthening     DATE OF SURGERY: 12/10/2024      HPI: Well 12 weeks following the above.  Pain control.  Off opioids.  Significant improvement from preoperative state.  SANE score of 80.    Physical Exam: Well-healed incisions  Straight leg raising intact  Lachman 0, no pivot shift  Stable to varus valgus stress testing.  Stable posterior drawer testing  Patella stable to examination, no apprehension      Imaging:-AP and lateral radiographs obtained today and reviewed by myself show excellent healing across tibial tubercle osteotomy    Impression: 12 weeks status post tibial tubercle osteotomy and MPFL reconstruction    Plan:   Weightbearing as tolerated  Range of motion as tolerated  No specific restrictions  Continue to let knee mature  Follow-up 1 year anniversary of surgery with AP and lateral radiographs

## 2025-03-05 NOTE — LETTER
3/5/2025      Suresh Will  [address]  [facility] [address]      Dear Colleague,    Thank you for referring your patient, Suresh Will, to the [facility] [place]. Please see a copy of my visit note below.    DIAGNOSIS:  Recurrent patellar instability  Trochlear and patellar chondrosis     PROCEDURE:    Chondroplasty of the patella  Chondroplasty of the trochlea  Loose body removal requiring creation of a superior lateral portal  Autologous chondrocyte implantation biopsy  Tibial tubercle osteotomy with anterior medialization  Medial patellofemoral ligament reconstruction with allograft  Medial retinacular imbrication  Lateral retinacular lengthening     DATE OF SURGERY: 12/10/2024      HPI: Well 12 weeks following the above.  Pain control.  Off opioids.  Significant improvement from preoperative state.  SANE score of 80.    Physical Exam: Well-healed incisions  Straight leg raising intact  Lachman 0, no pivot shift  Stable to varus valgus stress testing.  Stable posterior drawer testing  Patella stable to examination, no apprehension      Imaging:-AP and lateral radiographs obtained today and reviewed by myself show excellent healing across tibial tubercle osteotomy    Impression: 12 weeks status post tibial tubercle osteotomy and MPFL reconstruction    Plan:   Weightbearing as tolerated  Range of motion as tolerated  No specific restrictions  Continue to let knee mature  Follow-up 1 year anniversary of surgery with AP and lateral radiographs      Again, thank you for allowing me to participate in the care of your patient.        Sincerely,        Jroge Toure MD    Electronically signed

## 2025-03-05 NOTE — NURSING NOTE
"Reason For Visit:   Chief Complaint   Patient presents with    RECHECK     DOS: 12/10/24 left knee arthroscopy, chondroplasty, loose body removal, biopsy, TTO and lateral lengthening, MPFL reconstruction with allograft     Date of surgery: 12/10/24  Type of surgery:   PROCEDURE:  Examination under anesthesia left knee  Left knee arthroscopy  Chondroplasty of the patella  Chondroplasty of the trochlea  Loose body removal requiring creation of a superior lateral portal  Autologous chondrocyte implantation biopsy  Tibial tubercle osteotomy with anterior medialization  Medial patellofemoral ligament reconstruction with allograft  Medial retinacular imbrication  Lateral retinacular lengthening      SANE Score  Left Knee: 80  Right Knee: 100    Pain Assessment  Patient Currently in Pain: Denies    Ht 1.753 m (5' 9\")   Wt 96.2 kg (212 lb)   BMI 31.31 kg/m         No Known Allergies    No current outpatient medications on file.     No current facility-administered medications for this visit.         Tiara Hansen, ATC    "

## 2025-03-06 ENCOUNTER — MYC MEDICAL ADVICE (OUTPATIENT)
Dept: ORTHOPEDICS | Facility: CLINIC | Age: 20
End: 2025-03-06
Payer: COMMERCIAL

## 2025-03-06 ENCOUNTER — TELEPHONE (OUTPATIENT)
Dept: ORTHOPEDICS | Facility: CLINIC | Age: 20
End: 2025-03-06
Payer: COMMERCIAL

## 2025-03-06 NOTE — TELEPHONE ENCOUNTER
Left Voicemail (1st Attempt) and Sent Mychart (1st Attempt) for the patient to call back and schedule the following:    Appointment type: Return Knee  Provider: Dr. Toure  Return date: 12/10/25  Specialty phone number: [phone]

## 2025-03-10 NOTE — TELEPHONE ENCOUNTER
Left Voicemail (2nd Attempt) and Sent Mychart (2nd Attempt) for the patient to call back and schedule the following:    Appointment type: Return Knee  Provider: Dr. Toure  Return date: 12/10/25  Specialty phone number: [phone]

## 2025-06-08 ENCOUNTER — HEALTH MAINTENANCE LETTER (OUTPATIENT)
Age: 20
End: 2025-06-08

## 2025-08-19 ENCOUNTER — WALK IN (OUTPATIENT)
Dept: URGENT CARE | Age: 20
End: 2025-08-19

## 2025-08-19 VITALS
RESPIRATION RATE: 18 BRPM | DIASTOLIC BLOOD PRESSURE: 80 MMHG | BODY MASS INDEX: 33.66 KG/M2 | HEIGHT: 70 IN | HEART RATE: 73 BPM | OXYGEN SATURATION: 98 % | WEIGHT: 235.12 LBS | SYSTOLIC BLOOD PRESSURE: 138 MMHG | TEMPERATURE: 98.5 F

## 2025-08-19 DIAGNOSIS — Z23 ENCOUNTER FOR VACCINATION: Primary | ICD-10-CM

## (undated) DEVICE — SOL WATER IRRIG 1000ML BOTTLE 2F7114

## (undated) DEVICE — GLOVE BIOGEL PI SZ 8.0 40880

## (undated) DEVICE — DRSG SILVERCEL 4.25X4.25" 900404

## (undated) DEVICE — STRAP KNEE/BODY 31143004

## (undated) DEVICE — SU VICRYL 2-0 CT-1 27" UND J259H

## (undated) DEVICE — LINEN TOWEL PACK X5 5464

## (undated) DEVICE — SU ETHIBOND 1 CT-1 30" X425H

## (undated) DEVICE — SUTURE VICRYL+ 2-0 CT-2 27" UND VCP269H

## (undated) DEVICE — Device

## (undated) DEVICE — DRSG TEGADERM 4X10" 1627

## (undated) DEVICE — BUR ARTHREX COOLCUT DISSECTOR 3.5MM  AR-8350DS

## (undated) DEVICE — SU FIBERWIRE 2 38"  AR-7200

## (undated) DEVICE — POSITIONER ARMBOARD FOAM 1PAIR LF FP-ARMB1

## (undated) DEVICE — BLADE KNIFE SURG 15 371115

## (undated) DEVICE — SU LOOP #2 TIGERLOOP AR-7234T

## (undated) DEVICE — COVER CAMERA IN-LIGHT DISP LT-C02

## (undated) DEVICE — SU VICRYL 0 CT-1 27" UND J260H

## (undated) DEVICE — SOL NACL 0.9% IRRIG 3000ML BAG 2B7477

## (undated) DEVICE — SU ETHIBOND 1 CTX 30" X865H

## (undated) DEVICE — MARKING PEN REG/FINE DUALT TIP WITH RULER 1437SR-100

## (undated) DEVICE — PACK ACL SUPPLEMENT STD

## (undated) DEVICE — SOL HYDROGEN PEROXIDE 3% 4OZ BOTTLE F0010

## (undated) DEVICE — GOWN IMPERVIOUS SPECIALTY XLG/XLONG 32474

## (undated) DEVICE — ESU GROUND PAD ADULT W/CORD E7507

## (undated) DEVICE — BLADE SAW SAGITTAL STRK 34.5X11.5X0.64MM 2108-148-000S8

## (undated) DEVICE — TUBING ARTHROSCOPY PUMP ARTHREX AR-6410

## (undated) DEVICE — SPONGE LAP 18X18" X8435

## (undated) DEVICE — LINEN ORTHO PACK 5446

## (undated) DEVICE — BNDG SPANDAGRIP SZ J LF BEIGE 6.75" SAG13117

## (undated) DEVICE — SU NDL MCGOWAN 1/2 1859-6D

## (undated) DEVICE — DRSG STERI STRIP 1/2X4" R1547

## (undated) DEVICE — SUCTION MANIFOLD NEPTUNE 2 SYS 4 PORT 0702-020-000

## (undated) DEVICE — BLADE KNIFE SURG 10 371110

## (undated) DEVICE — DRAPE C-ARM W/STRAPS 42X72" 07-CA104

## (undated) DEVICE — ESU PENCIL W/SMOKE EVAC NEPTUNE STRYKER 0703-046-000

## (undated) DEVICE — SU FIBERWIRE 2 38" T-8 NDL  AR-7206

## (undated) DEVICE — GLOVE BIOGEL PI MICRO INDICATOR UNDERGLOVE SZ 8.0 48980

## (undated) DEVICE — SU LOOP #2 FIBERLOOP  AR-7234

## (undated) DEVICE — SPECIMEN CONTAINER 5OZ STERILE 2600SA

## (undated) DEVICE — ESU HOLDER LAP INST DISP PURPLE LONG 330MM H-PRO-330

## (undated) DEVICE — DRSG TEGADERM 4X4 3/4" 1626W

## (undated) DEVICE — KIT ANCHOR ARTHREX SM JOINT BIOCOMP SUTURETAK AR-8934DSC

## (undated) DEVICE — DRAPE C-ARMOR 5 SIDED 5523

## (undated) DEVICE — SU MONOCRYL 3-0 PS-1 27" Y936H

## (undated) RX ORDER — OXYCODONE HYDROCHLORIDE 5 MG/1
TABLET ORAL
Status: DISPENSED
Start: 2024-12-10

## (undated) RX ORDER — ACETAMINOPHEN 325 MG/1
TABLET ORAL
Status: DISPENSED
Start: 2024-12-10

## (undated) RX ORDER — DEXAMETHASONE SODIUM PHOSPHATE 4 MG/ML
INJECTION, SOLUTION INTRA-ARTICULAR; INTRALESIONAL; INTRAMUSCULAR; INTRAVENOUS; SOFT TISSUE
Status: DISPENSED
Start: 2024-12-10

## (undated) RX ORDER — KETOROLAC TROMETHAMINE 30 MG/ML
INJECTION, SOLUTION INTRAMUSCULAR; INTRAVENOUS
Status: DISPENSED
Start: 2024-12-10

## (undated) RX ORDER — EPHEDRINE SULFATE 50 MG/ML
INJECTION, SOLUTION INTRAMUSCULAR; INTRAVENOUS; SUBCUTANEOUS
Status: DISPENSED
Start: 2024-12-10

## (undated) RX ORDER — CEFAZOLIN SODIUM/WATER 2 G/20 ML
SYRINGE (ML) INTRAVENOUS
Status: DISPENSED
Start: 2024-12-10

## (undated) RX ORDER — PROPOFOL 10 MG/ML
INJECTION, EMULSION INTRAVENOUS
Status: DISPENSED
Start: 2024-12-10

## (undated) RX ORDER — FENTANYL CITRATE-0.9 % NACL/PF 10 MCG/ML
PLASTIC BAG, INJECTION (ML) INTRAVENOUS
Status: DISPENSED
Start: 2024-12-10

## (undated) RX ORDER — HYDROMORPHONE HYDROCHLORIDE 1 MG/ML
INJECTION, SOLUTION INTRAMUSCULAR; INTRAVENOUS; SUBCUTANEOUS
Status: DISPENSED
Start: 2024-12-10

## (undated) RX ORDER — FENTANYL CITRATE 50 UG/ML
INJECTION, SOLUTION INTRAMUSCULAR; INTRAVENOUS
Status: DISPENSED
Start: 2024-12-10

## (undated) RX ORDER — VANCOMYCIN HYDROCHLORIDE 1 G/20ML
INJECTION, POWDER, LYOPHILIZED, FOR SOLUTION INTRAVENOUS
Status: DISPENSED
Start: 2024-12-10

## (undated) RX ORDER — ONDANSETRON 2 MG/ML
INJECTION INTRAMUSCULAR; INTRAVENOUS
Status: DISPENSED
Start: 2024-12-10